# Patient Record
Sex: MALE | Race: BLACK OR AFRICAN AMERICAN | NOT HISPANIC OR LATINO | Employment: UNEMPLOYED | URBAN - METROPOLITAN AREA
[De-identification: names, ages, dates, MRNs, and addresses within clinical notes are randomized per-mention and may not be internally consistent; named-entity substitution may affect disease eponyms.]

---

## 2017-02-05 ENCOUNTER — HOSPITAL ENCOUNTER (EMERGENCY)
Facility: HOSPITAL | Age: 22
Discharge: HOME | End: 2017-02-05
Attending: EMERGENCY MEDICINE
Payer: MEDICAID

## 2017-02-05 VITALS
OXYGEN SATURATION: 97 % | HEIGHT: 70 IN | RESPIRATION RATE: 20 BRPM | BODY MASS INDEX: 21.47 KG/M2 | DIASTOLIC BLOOD PRESSURE: 59 MMHG | TEMPERATURE: 99 F | SYSTOLIC BLOOD PRESSURE: 126 MMHG | HEART RATE: 65 BPM | WEIGHT: 150 LBS

## 2017-02-05 DIAGNOSIS — J06.9 VIRAL URI WITH COUGH: Primary | ICD-10-CM

## 2017-02-05 DIAGNOSIS — R05.9 COUGH: ICD-10-CM

## 2017-02-05 LAB
DEPRECATED S PYO AG THROAT QL EIA: NEGATIVE
HETEROPH AB SERPL QL IA: NEGATIVE

## 2017-02-05 PROCEDURE — 94640 AIRWAY INHALATION TREATMENT: CPT

## 2017-02-05 PROCEDURE — 99284 EMERGENCY DEPT VISIT MOD MDM: CPT | Mod: 25

## 2017-02-05 PROCEDURE — 87081 CULTURE SCREEN ONLY: CPT

## 2017-02-05 PROCEDURE — 87880 STREP A ASSAY W/OPTIC: CPT

## 2017-02-05 PROCEDURE — 86308 HETEROPHILE ANTIBODY SCREEN: CPT

## 2017-02-05 PROCEDURE — 25000003 PHARM REV CODE 250: Performed by: PHYSICIAN ASSISTANT

## 2017-02-05 PROCEDURE — 25000242 PHARM REV CODE 250 ALT 637 W/ HCPCS: Performed by: PHYSICIAN ASSISTANT

## 2017-02-05 RX ORDER — IBUPROFEN 400 MG/1
400 TABLET ORAL
Status: COMPLETED | OUTPATIENT
Start: 2017-02-05 | End: 2017-02-05

## 2017-02-05 RX ORDER — BENZONATATE 100 MG/1
100 CAPSULE ORAL 3 TIMES DAILY PRN
Qty: 12 CAPSULE | Refills: 0 | Status: SHIPPED | OUTPATIENT
Start: 2017-02-05 | End: 2017-02-10

## 2017-02-05 RX ORDER — IPRATROPIUM BROMIDE AND ALBUTEROL SULFATE 2.5; .5 MG/3ML; MG/3ML
3 SOLUTION RESPIRATORY (INHALATION)
Status: COMPLETED | OUTPATIENT
Start: 2017-02-05 | End: 2017-02-05

## 2017-02-05 RX ORDER — ALBUTEROL SULFATE 90 UG/1
1-2 AEROSOL, METERED RESPIRATORY (INHALATION) EVERY 6 HOURS PRN
Qty: 1 INHALER | Refills: 0 | Status: SHIPPED | OUTPATIENT
Start: 2017-02-05 | End: 2018-01-20

## 2017-02-05 RX ADMIN — IBUPROFEN 400 MG: 400 TABLET, FILM COATED ORAL at 01:02

## 2017-02-05 RX ADMIN — IPRATROPIUM BROMIDE AND ALBUTEROL SULFATE 3 ML: .5; 3 SOLUTION RESPIRATORY (INHALATION) at 01:02

## 2017-02-05 NOTE — ED PROVIDER NOTES
Encounter Date: 2/5/2017    SCRIBE #1 NOTE: I, Shelley Gillespie, am scribing for, and in the presence of,  Carol Ann Kaba PA-C. I have scribed the following portions of the note - Other sections scribed: ROS and HPI.       History     Chief Complaint   Patient presents with    Nasal Congestion     Pt states he has been coughing up a lot of mucous.      Cough     Review of patient's allergies indicates:   Allergen Reactions    Penicillins     Eggs [egg derived]      HPI Comments: CC: Nasal Congestion.    HPI: This 21 y.o. male with a past medical history of Asthma, presents to the ED complaining of productive cough with green mucous and associated wheezing for 2 days. He also c/o sore throat, fatigue, nasal congestion, and rhinorrhea. He reports recent mold and dust exposure while cleaning grandfather's house and denies wearing mask. Pt states he has intermittent frontal HA with no associated vision changes, sensitivity to noise or light.  He reports no relief of symptoms with Flonase.    The history is provided by the patient. No  was used.     Past Medical History   Diagnosis Date    Asthma      No past medical history pertinent negatives.  Past Surgical History   Procedure Laterality Date    Knee arthroscopy      Cyst removal       History reviewed. No pertinent family history.  Social History   Substance Use Topics    Smoking status: Never Smoker    Smokeless tobacco: None    Alcohol use No     Review of Systems   Constitutional: Negative for chills and fever.   HENT: Positive for congestion, postnasal drip, rhinorrhea and sore throat. Negative for ear pain and facial swelling.    Eyes: Negative for itching and visual disturbance.   Respiratory: Positive for cough (productive). Negative for shortness of breath.    Cardiovascular: Negative for chest pain.   Gastrointestinal: Negative for abdominal pain, diarrhea, nausea and vomiting.   Musculoskeletal: Negative for myalgias.    Neurological: Positive for headaches (frontal). Negative for weakness and numbness.       Physical Exam   Initial Vitals   BP Pulse Resp Temp SpO2   02/05/17 1200 02/05/17 1200 02/05/17 1200 02/05/17 1200 02/05/17 1200   127/60 51 18 97.4 °F (36.3 °C) 99 %     Physical Exam    Nursing note and vitals reviewed.  Constitutional: He appears well-developed and well-nourished. No distress.   HENT:   Head: Normocephalic and atraumatic.   Right Ear: External ear normal.   Left Ear: A middle ear effusion is present.   Nose: Mucosal edema present. Right sinus exhibits no maxillary sinus tenderness and no frontal sinus tenderness. Left sinus exhibits no maxillary sinus tenderness and no frontal sinus tenderness.   Mouth/Throat: Uvula is midline. Oropharyngeal exudate and posterior oropharyngeal erythema present. No posterior oropharyngeal edema.   Cardiovascular: Regular rhythm. Bradycardia present.    Pulmonary/Chest: No respiratory distress. He has no decreased breath sounds. He has wheezes in the left upper field. He has no rhonchi. He has no rales.   Abdominal: Soft. Normal appearance and bowel sounds are normal. There is no tenderness.   Lymphadenopathy:     He has cervical adenopathy.        Right cervical: Posterior cervical adenopathy present.        Left cervical: Posterior cervical adenopathy present.   Neurological: He has normal strength. No cranial nerve deficit or sensory deficit.         ED Course   Procedures  Labs Reviewed   THROAT SCREEN, RAPID   CULTURE, STREP A,  THROAT   HETEROPHILE AB SCREEN             Medical Decision Making:   Initial Assessment:   Pt is 22 y/o male with PMHx of asthma presents for evaluation of productive cough with wheezing, sore throat, and fatigue. Reports recent mold and dust exposure. Pt afebrile in NAD. On exam, mild wheezing to MERE. +Mucosal edema. +oropharyngeal erythema and exudate to L tonsil. Posterior lymphadenopathy. Breathing treatment and ibuprofen given. I  considered strep pharyngitis, mono, PNA, and viral URI. Mono and strep negative. CXR negative for acute abnormalities. I suspect viral URI. Upon re-evaluation, pt wheezing resolved. Will treat pt symptomatically with tessalon and albuterol. Instructed to use OTC flonase and singulair. Told pt to begin using face mask while working in areas of mold and dust exposure. Discharged to home in stable condition. Follow up with PCP for re-evaluation in 2 days. Return to ED if develop difficulty breathing, if symptoms worsen and as needed.      I discussed this pt with Dr. Potter who agrees with assessment and plan.             Scribe Attestation:   Scribe #1: I performed the above scribed service and the documentation accurately describes the services I performed. I attest to the accuracy of the note.    Attending Attestation:     Physician Attestation Statement for NP/PA:   I discussed this assessment and plan of this patient with the NP/PA, but I did not personally examine the patient. The face to face encounter was performed by the NP/PA.    Other NP/PA Attestation Additions:      Medical Decision Makin-year-old male presenting with cough and nasal congestion.  Also has exudate on tonsils.  Rapid strep and Monospot negative.  Chest x-ray shows no pneumonia.  I agree with plan.       Physician Attestation for Scribe:  Physician Attestation Statement for Scribe #1: I, Carol Ann Kaba PA-C, reviewed documentation, as scribed by Shelley Gillespie in my presence, and it is both accurate and complete.                 ED Course     Clinical Impression:   The primary encounter diagnosis was Viral URI with cough. A diagnosis of Cough was also pertinent to this visit.    Disposition:   Disposition: Discharged  Condition: Stable       Carol Ann Kaba PA-C  17 [phone]       Kevin Potter MD  17 [phone]

## 2017-02-05 NOTE — DISCHARGE INSTRUCTIONS
Viral Upper Respiratory Illness with Wheezing (Adult)  You have a viral upper respiratory illness (URI), which is another term for the common cold. When the infection causes a lot of irritation, the air passages can go into spasm. This causes wheezing and shortness of breath.    This illness is contagious during the first few days. It is spread through the air by coughing and sneezing. It may also be spread by direct contact (touching the sick person and then touching your own eyes, nose, or mouth). Frequent handwashing will decrease the risk.  Most viral illnesses go away within 7 to 10 days with rest and simple home remedies. Sometimes the illness may last for several weeks. Antibiotics will not kill a virus, and they are generally not prescribed for this condition.  Home care  · If symptoms are severe, rest at home for the first 2 to 3 days. When you resume activity, don't let yourself get too tired.  · Avoid being exposed to cigarette smoke (yours or others).  · You may use acetaminophen or ibuprofen to control pain and fever, unless another medicine was prescribed. (Note: If you have chronic liver or kidney disease, have ever had a stomach ulcer or gastrointestinal bleeding, or are taking blood-thinning medicines, talk with your healthcare provider before using these medicines.) Aspirin should never be given to anyone under 18 years of age who is ill with a viral infection or fever. It may cause severe liver or brain damage.  · Your appetite may be poor, so a light diet is fine. Avoid dehydration by drinking 6 to 8 glasses of fluids per day (water, soft drinks, juices, tea, or soup). Extra fluids will help loosen secretions in the nose and lungs.  · Over-the-counter cold medicines will not shorten the length of time youre sick, but they may be helpful for the following symptoms: cough, sore throat, and nasal and sinus congestion. (Note: Do not use decongestants if you have high blood pressure.)  Follow-up  care  Follow up with your healthcare provider, or as advised.  When to seek medical advice  Call your healthcare provider right away if any of these occur:  · Cough with lots of colored sputum (mucus)  · Severe headache; face, neck, or ear pain  · Difficulty swallowing due to throat pain  · Fever of 100.4ºF (38ºC) or higher, or as directed by your healthcare provider  Call 911, or get immediate medical care  Call emergency services right away if any of these occur:  · Chest pain, shortness of breath, worsening wheezing, or difficulty breathing  · Coughing up blood  · Inability to swallow due to throat pain    You were prescribed Tessalon for cough and Albuterol Inhaler for wheezing today. Use as prescribed. Follow up with primary care doctor for re-evaluation in 2 days and to establish primary care. Continue using Flonase and you may also purchase over-the-counter Singulair to help with congestion. Return to ER if you develop difficulty breathing, if symptoms worsen or as needed.

## 2017-02-05 NOTE — ED AVS SNAPSHOT
[facility]  [address] [place] [address]               Han Aguirre   2017 12:35 PM   ED    Description:  Male : 1995   Department:  [facility]           Your Care was Coordinated By:     Provider Role From To    Kevin Potter MD Attending Provider 17 [phone] --    Carol Ann Kaba PA-C Physician Assistant 17 [phone] --      Reason for Visit     Nasal Congestion     Cough           Diagnoses this Visit        Comments    Viral URI with cough    -  Primary     Cough           ED Disposition     None           To Do List           Follow-up Information     Follow up with Alejandrina Bhardwaj MD. Schedule an appointment as soon as possible for a visit in 2 days.    Specialty:  Internal Medicine    Why:  for re-evaluation and to establish primary care    Contact information:    [address]  [facility] [address]  [phone]          Go to [facility].    Specialty:  Emergency Medicine    Why:  As needed, If symptoms worsen    Contact information:    [address] [place] [id number] [phone]       These Medications        Disp Refills Start End    albuterol 90 mcg/actuation inhaler 1 Inhaler 0 2017 2/10/2017    Inhale 1-2 puffs into the lungs every 6 (six) hours as needed for Wheezing. Rescue - Inhalation    benzonatate (TESSALON) 100 MG capsule 12 capsule 0 2017 2/10/2017    Take 1 capsule (100 mg total) by mouth 3 (three) times daily as needed for Cough. - Oral      [facility]     [facility] -  Assistance  Registered nurses in the [facility] provide clinical advisement, health education, appointment booking, and other advisory services.  Call for this free service at [phone].             Medications           Message regarding Medications     Verify the changes and/or additions to your medication  regime listed below are the same as discussed with your clinician today.  If any of these changes or additions are incorrect, please notify your healthcare provider.        START taking these NEW medications        Refills    albuterol 90 mcg/actuation inhaler 0    Sig: Inhale 1-2 puffs into the lungs every 6 (six) hours as needed for Wheezing. Rescue    Class: Print    Route: Inhalation    benzonatate (TESSALON) 100 MG capsule 0    Sig: Take 1 capsule (100 mg total) by mouth 3 (three) times daily as needed for Cough.    Class: Print    Route: Oral      These medications were administered today        Dose Freq    ibuprofen tablet 400 mg 400 mg ED 1 Time    Sig: Take 1 tablet (400 mg total) by mouth ED 1 Time.    Class: Normal    Route: Oral    Cosign for Ordering: Accepted by Kevin Potter MD on 2/5/2017 12:58 PM    albuterol-ipratropium 2.5mg-0.5mg/3mL nebulizer solution 3 mL 3 mL ED 1 Time    Sig: Take 3 mLs by nebulization ED 1 Time.    Class: Normal    Route: Nebulization    Cosign for Ordering: Accepted by Kevin Potter MD on 2/5/2017 12:58 PM      STOP taking these medications     albuterol (PROVENTIL) 2.5 mg /3 mL (0.083 %) nebulizer solution Take 2.5 mg by nebulization every 6 (six) hours as needed.    montelukast (SINGULAIR) 10 mg tablet Take 10 mg by mouth every evening.    cetirizine (ZYRTEC) 5 MG chewable tablet Take 5 mg by mouth once daily.    ibuprofen (ADVIL,MOTRIN) 600 MG tablet Take 1 tablet (600 mg total) by mouth 4 (four) times daily as needed for Pain.           Verify that the below list of medications is an accurate representation of the medications you are currently taking.  If none reported, the list may be blank. If incorrect, please contact your healthcare provider. Carry this list with you in case of emergency.           Current Medications     albuterol 90 mcg/actuation inhaler Inhale 1-2 puffs into the lungs every 6 (six) hours as needed for Wheezing. Rescue    benzonatate  "(TESSALON) 100 MG capsule Take 1 capsule (100 mg total) by mouth 3 (three) times daily as needed for Cough.           Clinical Reference Information           Your Vitals Were     BP Pulse Temp Resp Height Weight    127/60 (BP Location: Left arm) 57 97.4 °F (36.3 °C) (Oral) 17 5' 10" (1.778 m) 68 kg (150 lb)    SpO2 BMI             99% 21.52 kg/m2         Allergies as of 2/5/2017        Reactions    Penicillins     Eggs [Egg Derived]       Immunizations Administered on Date of Encounter - 2/5/2017     None      ED Micro, Lab, POCT     Start Ordered       Status Ordering Provider    02/05/17 1318 02/05/17 1317    Once,   Status:  Canceled      Canceled     02/05/17 1256 02/05/17 1256  Throat Screen, Rapid  Once      Final result     02/05/17 1256 02/05/17 1256  Heterophile Ab Screen  Once      Final result     02/05/17 1256 02/05/17 1256  Strep A culture, throat  Once      In process       ED Imaging Orders     Start Ordered       Status Ordering Provider    02/05/17 1256 02/05/17 1256  X-Ray Chest PA And Lateral  1 time imaging      Final result         Discharge Instructions         Viral Upper Respiratory Illness with Wheezing (Adult)  You have a viral upper respiratory illness (URI), which is another term for the common cold. When the infection causes a lot of irritation, the air passages can go into spasm. This causes wheezing and shortness of breath.    This illness is contagious during the first few days. It is spread through the air by coughing and sneezing. It may also be spread by direct contact (touching the sick person and then touching your own eyes, nose, or mouth). Frequent handwashing will decrease the risk.  Most viral illnesses go away within 7 to 10 days with rest and simple home remedies. Sometimes the illness may last for several weeks. Antibiotics will not kill a virus, and they are generally not prescribed for this condition.  Home care  · If symptoms are severe, rest at home for the first 2 to " 3 days. When you resume activity, don't let yourself get too tired.  · Avoid being exposed to cigarette smoke (yours or others).  · You may use acetaminophen or ibuprofen to control pain and fever, unless another medicine was prescribed. (Note: If you have chronic liver or kidney disease, have ever had a stomach ulcer or gastrointestinal bleeding, or are taking blood-thinning medicines, talk with your healthcare provider before using these medicines.) Aspirin should never be given to anyone under 18 years of age who is ill with a viral infection or fever. It may cause severe liver or brain damage.  · Your appetite may be poor, so a light diet is fine. Avoid dehydration by drinking 6 to 8 glasses of fluids per day (water, soft drinks, juices, tea, or soup). Extra fluids will help loosen secretions in the nose and lungs.  · Over-the-counter cold medicines will not shorten the length of time youre sick, but they may be helpful for the following symptoms: cough, sore throat, and nasal and sinus congestion. (Note: Do not use decongestants if you have high blood pressure.)  Follow-up care  Follow up with your healthcare provider, or as advised.  When to seek medical advice  Call your healthcare provider right away if any of these occur:  · Cough with lots of colored sputum (mucus)  · Severe headache; face, neck, or ear pain  · Difficulty swallowing due to throat pain  · Fever of 100.4ºF (38ºC) or higher, or as directed by your healthcare provider  Call 911, or get immediate medical care  Call emergency services right away if any of these occur:  · Chest pain, shortness of breath, worsening wheezing, or difficulty breathing  · Coughing up blood  · Inability to swallow due to throat pain    You were prescribed Tessalon for cough and Albuterol Inhaler for wheezing today. Use as prescribed. Follow up with primary care doctor for re-evaluation in 2 days and to establish primary care. Continue using Flonase and you may also  purchase over-the-counter Singulair to help with congestion. Return to ER if you develop difficulty breathing, if symptoms worsen or as needed.         [facility] Sign-Up     Activating your [facility] account is as easy as 1-2-3!     1) Visit [email or web address], select Sign Up Now, enter this activation code and your date of birth, then select Next.  [email or web address]  Expires: 3/22/2017  2:13 PM      2) Create a username and password to use when you visit [facility] in the future and select a security question in case you lose your password and select Next.    3) Enter your e-mail address and click Sign Up!    Additional Information  If you have questions, please e-mail [email or web address] or call [phone] to talk to our [facility] staff. Remember, [facility] is NOT to be used for urgent needs. For medical emergencies, dial 911.          [facility] complies with applicable Federal civil rights laws and does not discriminate on the basis of race, color, national origin, age, disability, or sex.        Language Assistance Services     ATTENTION: Language assistance services are available, free of charge. Please call [phone].      ATENCIÓN: Si habla español, tiene a sharma disposición servicios gratuitos de asistencia lingüística. Llame al [phone].     MICHELLE Ý: N?u b?n nói Ti?ng Vi?t, có các d?ch v? h? tr? ngôn ng? mi?n phí dành cho b?n. G?i s? [phone].

## 2017-02-07 LAB — BACTERIA THROAT CULT: NORMAL

## 2017-02-23 ENCOUNTER — HOSPITAL ENCOUNTER (EMERGENCY)
Facility: HOSPITAL | Age: 22
Discharge: HOME | End: 2017-02-23
Attending: EMERGENCY MEDICINE
Payer: MEDICAID

## 2017-02-23 VITALS
BODY MASS INDEX: 21.7 KG/M2 | HEART RATE: 60 BPM | RESPIRATION RATE: 18 BRPM | DIASTOLIC BLOOD PRESSURE: 73 MMHG | WEIGHT: 155 LBS | OXYGEN SATURATION: 98 % | SYSTOLIC BLOOD PRESSURE: 121 MMHG | HEIGHT: 71 IN | TEMPERATURE: 98 F

## 2017-02-23 DIAGNOSIS — N34.1 URETHRITIS, NONSPECIFIC: Primary | ICD-10-CM

## 2017-02-23 PROCEDURE — 99283 EMERGENCY DEPT VISIT LOW MDM: CPT

## 2017-02-23 PROCEDURE — 87591 N.GONORRHOEAE DNA AMP PROB: CPT

## 2017-02-23 PROCEDURE — 25000003 PHARM REV CODE 250: Performed by: NURSE PRACTITIONER

## 2017-02-23 RX ORDER — AZITHROMYCIN 250 MG/1
2000 TABLET, FILM COATED ORAL
Status: COMPLETED | OUTPATIENT
Start: 2017-02-23 | End: 2017-02-23

## 2017-02-23 RX ORDER — ONDANSETRON 4 MG/1
4 TABLET, ORALLY DISINTEGRATING ORAL
Status: COMPLETED | OUTPATIENT
Start: 2017-02-23 | End: 2017-02-23

## 2017-02-23 RX ADMIN — AZITHROMYCIN 2000 MG: 250 TABLET, FILM COATED ORAL at 01:02

## 2017-02-23 RX ADMIN — ONDANSETRON 4 MG: 4 TABLET, ORALLY DISINTEGRATING ORAL at 01:02

## 2017-02-23 NOTE — ED AVS SNAPSHOT
[facility]  [place] [address]               Han Aguirre   2017 11:33 AM   ED    Description:  Male : 1995   Department:  [facility]           Your Care was Coordinated By:     Provider Role From To    Epifanio Martinez MD Attending Provider 17 1140 --    Christine Headley NP Nurse Practitioner 17 0217 --      Reason for Visit     Dysuria           Diagnoses this Visit        Comments    Urethritis, nonspecific    -  Primary       ED Disposition     None           To Do List           Follow-up Information     Follow up with Juve Locke MD.    Specialty:  Family Medicine    Why:  As needed    Contact information:    [address] [place] [address] [phone]          Follow up with [facility].    Specialty:  Emergency Medicine    Why:  If symptoms worsen or any other concerns    Contact information:    [place] [address] [phone]      [facility]     [facility] Line -  Assistance  Registered nurses in the [facility] provide clinical advisement, health education, appointment booking, and other advisory services.  Call for this free service at [phone].             Medications           Message regarding Medications     Verify the changes and/or additions to your medication regime listed below are the same as discussed with your clinician today.  If any of these changes or additions are incorrect, please notify your healthcare provider.        These medications were administered today        Dose Freq    ondansetron disintegrating tablet 4 mg 4 mg ED 1 Time    Sig: Take 1 tablet (4 mg total) by mouth ED 1 Time.    Class: Normal    Route: Oral    azithromycin tablet 2,000 mg 2,000 mg ED 1 Time    Sig: Take 8 tablets (2,000 mg total) by mouth ED 1 Time.    Class: Normal    Route: Oral           Verify  "that the below list of medications is an accurate representation of the medications you are currently taking.  If none reported, the list may be blank. If incorrect, please contact your healthcare provider. Carry this list with you in case of emergency.           Current Medications     albuterol 90 mcg/actuation inhaler Inhale 1-2 puffs into the lungs every 6 (six) hours as needed for Wheezing. Rescue    azithromycin tablet 2,000 mg Take 8 tablets (2,000 mg total) by mouth ED 1 Time.    ondansetron disintegrating tablet 4 mg Take 1 tablet (4 mg total) by mouth ED 1 Time.           Clinical Reference Information           Your Vitals Were     BP Pulse Temp Resp Height Weight    124/72 (BP Location: Right arm, Patient Position: Sitting) 64 98.4 °F (36.9 °C) (Oral) 17 5' 11" (1.803 m) 70.3 kg (155 lb)    SpO2 BMI             98% 21.62 kg/m2         Allergies as of 2/23/2017        Reactions    Penicillins     Eggs [Egg Derived]       Immunizations Administered on Date of Encounter - 2/23/2017     None      ED Micro, Lab, POCT     Start Ordered       Status Ordering Provider    02/23/17 1154 02/23/17 1154  C. trachomatis/N. gonorrhoeae by AMP DNA Urine  STAT      In process       ED Imaging Orders     None        Discharge Instructions         Urethritis Due to Gonorrhea or Chlamydia (Adult male)    You have urethritis. This is an inflammation in the urethra. The urethra is the tube between the bladder and the tip of the penis. Urine drains out of the body through the urethra. There are 2 main types of this condition:  · Gonococcal urethritis () is an infection caused by gonorrhea.  · Non-gonococcal urethritis (GRABIEL) is an infection that is usually caused by chlamydia. Other infections can also be the cause.  Women often have no symptoms. Men are more likely to have symptoms, but may not. Symptoms can start within 1 week after infection, but can take a month or more, if they even occur. Some symptoms are:  · Burning " or pain when urinating  · Irritation in the penis  · Pus discharge from the penis  · Pain and possible swelling in one or both testicles  Infections in the urethra are usually caused by sexually transmitted diseases, or STDs. The most common infections are gonorrhea, chlamydia, or both.  Gonorrhea infections  Gonococcal urethritis () is an infection of the urethra. It is caused by gonorrhea. Gonorrhea is a sexually transmitted infection (STI). Gonorrhea can also be in other areas of the body. This can cause:  · Rectal pain and discharge  · Throat infection  · Eye infections (conjunctivitis)  Without treatment, the infection can get worse and spread to other parts of your body. The infection can cause rashes, arthritis, and infections in your joints, heart, and brain.  Non-gonococcal infections, or GRABIEL infections  Non-gonococcal urethritis (GRABIEL) is an infection of the urethra. It is usually caused by chlamydia. Symptoms may clear up in a few weeks or months, even without treatment. However, without treatment, the bacteria that cause GRABIEL can stay in the urethra. This means that even if symptoms clear, you can still have an infection. You can spread it to others if you are not treated.  Urethritis caused by an infection can be cured, but it needs to be treated with antibiotics. If you don't get treated, you can give it to someone else. If you give it to a woman, it can cause a serious pelvic infection and infertility.  It is important to remember that you can have an infection without symptoms. For this reason, your sexual partner(s) needs to be treated, even if he or she has no symptoms. If he or she is not treated, and you continue to have sex, you will be infected again. Your partner(s) should contact his or her own healthcare provider to be examined and treated. An urgent care clinic or the Public Health Department can also do this.  Home care  The following guidelines will help you care for yourself at  home:  · Take all the antibiotics you were given until they are used up. It is important to finish them, even if you are feeling better. This ensures the infection is completely cleared up.  · No sex until both you and your partner(s) have finished all the antibiotics, and your healthcare provider says you are no longer contagious.  · You can take acetaminophen or ibuprofen for pain, unless you were given a different pain medicine. If you have chronic liver or kidney disease or have ever had a stomach ulcer or GI bleeding, or are taking blood thinners, talk with your healthcare provider before using these medicines.  · Aspirin should never be used in anyone under 18 years of age who has a fever.  · Learn about and use safe sex practices. The safest sex is with a partner who has tested negative and only has sex with you. Condoms can keep some STDs from spreading, including gonorrhea, chlamydia and HIV, but are not a guarantee.  Follow-up care  Follow up with your healthcare provider, or as advised. If a culture test was taken, you may call for the results as directed. Another culture test should be done 4 to 6 weeks after treatment to be sure the infection is gone. Follow up with your healthcare provider or the Public Health Department for a complete STD screening, including HIV testing. For more information about STDs, contact CDC-INFO at 209-399-2196.  When to seek medical advice  Call your healthcare provider right away if any of these occur:  · No improvement after 3 days of treatment, although you can have some symptoms longer  · Unable to urinate  · Rash or joint pain  · Painful sores on the penis  · Enlarged painful lymph nodes (lumps) in the groin  · Testicle pain or swelling of the scrotum  Date Last Reviewed: 10/1/2016  © 3553-0836 [employer]. [address] [place] [address], [place] [address]. All rights reserved. This information is not intended as a substitute for professional medical care. Always  follow your healthcare professional's instructions.          Urethritis in Men     An inflamed urethra can cause pain during urination.   Urethritis occurs when the urethra is inflamed. The urethra is the tube that runs from the bladder through the penis. The urethra can become swollen and cause burning pain when you urinate. Symptoms may include itching or tingling of the penis or a pus discharge from the penis. You may also have pain with sex and masturbation. Some men may not have symptoms.  What causes urethritis?  Urethritis can be caused by a bacterial or viral infection. Such an infection can lead to conditions such as a urinary tract infection (UTI) or sexually transmitted diseases (STD). Urethritis can also be caused by injury or sensitivity or allergy to chemicals in lotions and other products.  How is urethritis diagnosed?  Your health care provider will examine you and ask about your symptoms and health history. You may also have one or more of the following tests:  · Urine test to take samples of urine and have them checked for problems.  · Blood test to take a sample of blood and have it checked for problems.  · Urethral discharge to take a sample of fluid from inside the urethra. A cotton swab is inserted into the opening of the penis and into the urethra.  · Cystoscopy to allow the health care provider to look for problems in the urinary tract. The test uses a thin, flexible telescope called a cystoscope with a light and camera attached. The scope is inserted into the urethra.  How is urethritis treated?  Treatment depends on the cause of urethritis. If its due to a bacterial infection, antibiotics (medications that fight infection) will be given. Your health care provider can tell you more about your treatment options. In the meantime, your symptoms can be treated. To relieve pain and swelling, anti-inflammatory medications, such as ibuprofen, may be given. Untreated, symptoms may get worse. Also,  scar tissue can form in the urethra, causing it to narrow.  When to seek medical care   Call the health care provider right away if you have any of the following:  · Fever of 100.4°F (38.0°C) or higher   · Blood in the urine or semen  · Burning pain with urination  · Increased urge to urinate  · Discharge from the penis  · Itching, tenderness, or swelling in the penis or groin  · Pain during sex or masturbation   Preventing STDs  When it comes to sex, its important to take care and be safe. Any sexual contact with the penis, vagina, anus, or mouth can spread an STD. The only sure way to prevent STDs is abstinence (not having sex). But there are ways to make sex safer. Use a latex condom each time you have sex. And talk to your partner about STDs before you have sex.   Date Last Reviewed: 9/7/2014  © 2899-9765 [employer]. [address] [place] [address]. All rights reserved. This information is not intended as a substitute for professional medical care. Always follow your healthcare professional's instructions.          [facility] Sign-Up     Activating your [facility] account is as easy as 1-2-3!     1) Visit [email or web address], select Sign Up Now, enter this activation code and your date of birth, then select Next.  [email or web address]  Expires: 3/22/2017  2:13 PM      2) Create a username and password to use when you visit [facility] in the future and select a security question in case you lose your password and select Next.    3) Enter your e-mail address and click Sign Up!    Additional Information  If you have questions, please e-mail [email or web address] or call [phone] to talk to our [facility] staff. Remember, [facility] is NOT to be used for urgent needs. For medical emergencies, dial 911.          [facility] complies with applicable Federal civil rights laws and does not discriminate on the basis of race, color, national origin, age, disability, or sex.         Language Assistance Services     ATTENTION: Language assistance services are available, free of charge. Please call [phone].      ATENCIÓN: Si habla navañol, tiene a sharma disposición servicios gratuitos de asistencia lingüística. Llame al [phone].     CHÚ Ý: N?u b?n nói Ti?ng Vi?t, có các d?ch v? h? tr? ngôn ng? mi?n phí dành cho b?n. G?i s? [phone].

## 2017-02-23 NOTE — DISCHARGE INSTRUCTIONS
Urethritis Due to Gonorrhea or Chlamydia (Adult male)    You have urethritis. This is an inflammation in the urethra. The urethra is the tube between the bladder and the tip of the penis. Urine drains out of the body through the urethra. There are 2 main types of this condition:  · Gonococcal urethritis () is an infection caused by gonorrhea.  · Non-gonococcal urethritis (GRABIEL) is an infection that is usually caused by chlamydia. Other infections can also be the cause.  Women often have no symptoms. Men are more likely to have symptoms, but may not. Symptoms can start within 1 week after infection, but can take a month or more, if they even occur. Some symptoms are:  · Burning or pain when urinating  · Irritation in the penis  · Pus discharge from the penis  · Pain and possible swelling in one or both testicles  Infections in the urethra are usually caused by sexually transmitted diseases, or STDs. The most common infections are gonorrhea, chlamydia, or both.  Gonorrhea infections  Gonococcal urethritis () is an infection of the urethra. It is caused by gonorrhea. Gonorrhea is a sexually transmitted infection (STI). Gonorrhea can also be in other areas of the body. This can cause:  · Rectal pain and discharge  · Throat infection  · Eye infections (conjunctivitis)  Without treatment, the infection can get worse and spread to other parts of your body. The infection can cause rashes, arthritis, and infections in your joints, heart, and brain.  Non-gonococcal infections, or GRABIEL infections  Non-gonococcal urethritis (GRABIEL) is an infection of the urethra. It is usually caused by chlamydia. Symptoms may clear up in a few weeks or months, even without treatment. However, without treatment, the bacteria that cause GRABIEL can stay in the urethra. This means that even if symptoms clear, you can still have an infection. You can spread it to others if you are not treated.  Urethritis caused by an infection can be cured, but it  needs to be treated with antibiotics. If you don't get treated, you can give it to someone else. If you give it to a woman, it can cause a serious pelvic infection and infertility.  It is important to remember that you can have an infection without symptoms. For this reason, your sexual partner(s) needs to be treated, even if he or she has no symptoms. If he or she is not treated, and you continue to have sex, you will be infected again. Your partner(s) should contact his or her own healthcare provider to be examined and treated. An urgent care clinic or the Public Health Department can also do this.  Home care  The following guidelines will help you care for yourself at home:  · Take all the antibiotics you were given until they are used up. It is important to finish them, even if you are feeling better. This ensures the infection is completely cleared up.  · No sex until both you and your partner(s) have finished all the antibiotics, and your healthcare provider says you are no longer contagious.  · You can take acetaminophen or ibuprofen for pain, unless you were given a different pain medicine. If you have chronic liver or kidney disease or have ever had a stomach ulcer or GI bleeding, or are taking blood thinners, talk with your healthcare provider before using these medicines.  · Aspirin should never be used in anyone under 18 years of age who has a fever.  · Learn about and use safe sex practices. The safest sex is with a partner who has tested negative and only has sex with you. Condoms can keep some STDs from spreading, including gonorrhea, chlamydia and HIV, but are not a guarantee.  Follow-up care  Follow up with your healthcare provider, or as advised. If a culture test was taken, you may call for the results as directed. Another culture test should be done 4 to 6 weeks after treatment to be sure the infection is gone. Follow up with your healthcare provider or the Public Health Department for a complete  STD screening, including HIV testing. For more information about STDs, contact CDC-INFO at 940-687-9186.  When to seek medical advice  Call your healthcare provider right away if any of these occur:  · No improvement after 3 days of treatment, although you can have some symptoms longer  · Unable to urinate  · Rash or joint pain  · Painful sores on the penis  · Enlarged painful lymph nodes (lumps) in the groin  · Testicle pain or swelling of the scrotum  Date Last Reviewed: 10/1/2016  © 7316-8943 [employer]. [address] [place] [address]. All rights reserved. This information is not intended as a substitute for professional medical care. Always follow your healthcare professional's instructions.          Urethritis in Men     An inflamed urethra can cause pain during urination.   Urethritis occurs when the urethra is inflamed. The urethra is the tube that runs from the bladder through the penis. The urethra can become swollen and cause burning pain when you urinate. Symptoms may include itching or tingling of the penis or a pus discharge from the penis. You may also have pain with sex and masturbation. Some men may not have symptoms.  What causes urethritis?  Urethritis can be caused by a bacterial or viral infection. Such an infection can lead to conditions such as a urinary tract infection (UTI) or sexually transmitted diseases (STD). Urethritis can also be caused by injury or sensitivity or allergy to chemicals in lotions and other products.  How is urethritis diagnosed?  Your health care provider will examine you and ask about your symptoms and health history. You may also have one or more of the following tests:  · Urine test to take samples of urine and have them checked for problems.  · Blood test to take a sample of blood and have it checked for problems.  · Urethral discharge to take a sample of fluid from inside the urethra. A cotton swab is inserted into the opening of the penis  and into the urethra.  · Cystoscopy to allow the health care provider to look for problems in the urinary tract. The test uses a thin, flexible telescope called a cystoscope with a light and camera attached. The scope is inserted into the urethra.  How is urethritis treated?  Treatment depends on the cause of urethritis. If its due to a bacterial infection, antibiotics (medications that fight infection) will be given. Your health care provider can tell you more about your treatment options. In the meantime, your symptoms can be treated. To relieve pain and swelling, anti-inflammatory medications, such as ibuprofen, may be given. Untreated, symptoms may get worse. Also, scar tissue can form in the urethra, causing it to narrow.  When to seek medical care   Call the health care provider right away if you have any of the following:  · Fever of 100.4°F (38.0°C) or higher   · Blood in the urine or semen  · Burning pain with urination  · Increased urge to urinate  · Discharge from the penis  · Itching, tenderness, or swelling in the penis or groin  · Pain during sex or masturbation   Preventing STDs  When it comes to sex, its important to take care and be safe. Any sexual contact with the penis, vagina, anus, or mouth can spread an STD. The only sure way to prevent STDs is abstinence (not having sex). But there are ways to make sex safer. Use a latex condom each time you have sex. And talk to your partner about STDs before you have sex.   Date Last Reviewed: 9/7/2014  © 7519-3083 [employer]. [address] [place] [address], [place] [address]. All rights reserved. This information is not intended as a substitute for professional medical care. Always follow your healthcare professional's instructions.

## 2017-02-23 NOTE — ED PROVIDER NOTES
Encounter Date: 2/23/2017    SCRIBE #1 NOTE: I, Abril Tejeda, am scribing for, and in the presence of,  Christine Headley MD. I have scribed the following portions of the note - Other sections scribed: HPI/ROS.       History     Chief Complaint   Patient presents with    Dysuria     Pt. c/o burning with urination and pain at the meatus that began today while at work.      Review of patient's allergies indicates:   Allergen Reactions    Penicillins     Eggs [egg derived]      HPI Comments: CC: Dysuria    HPI: 21 y.o. M with asthma presents to the ED c/o dysuria with associated penile pain beginning today. Pt is unsure about penile discharge. Pain is severe. Pt reports partner has not been complaining about any symptoms. Pt is concerned because his condom popped during a sexual encounter. Pt denies fever, testicular pain, abdominal pain, N/V/D, and any other symptoms. Pt is unsure about his immunization status.     The history is provided by the patient.     Past Medical History   Diagnosis Date    Asthma      Past Surgical History   Procedure Laterality Date    Knee arthroscopy      Cyst removal      Nose surgery       History reviewed. No pertinent family history.  Social History   Substance Use Topics    Smoking status: Never Smoker    Smokeless tobacco: None    Alcohol use No     Review of Systems   Constitutional: Negative for fever.   HENT: Negative for sore throat.    Eyes: Negative for visual disturbance.   Respiratory: Negative for cough.    Cardiovascular: Negative for chest pain.   Gastrointestinal: Negative for abdominal pain, nausea and vomiting.   Genitourinary: Positive for discharge, dysuria and penile pain. Negative for testicular pain.   Musculoskeletal: Negative for back pain.   Skin: Negative for rash.   Neurological: Negative for headaches.       Physical Exam   Initial Vitals   BP Pulse Resp Temp SpO2   02/23/17 1057 02/23/17 1057 02/23/17 1057 02/23/17 1057 02/23/17 1057   124/72 64 17  98.4 °F (36.9 °C) 98 %     Physical Exam    Nursing note and vitals reviewed.  Constitutional: He appears well-developed and well-nourished.   HENT:   Head: Normocephalic.   Eyes: Conjunctivae are normal.   Neck: Normal range of motion. Neck supple.   Abdominal: Soft.   Genitourinary: Discharge found.   Genitourinary Comments: Tender with pressure to glans.   No lesions present but (+) discharge at meatus.    Musculoskeletal: Normal range of motion.   Neurological: He is alert and oriented to person, place, and time.   Skin: Skin is warm and dry.   Psychiatric: He has a normal mood and affect.         ED Course   Procedures  Labs Reviewed   C. TRACHOMATIS/N. GONORRHOEAE BY AMP DNA             Medical Decision Making:   Initial Assessment:   21yr old male presents with 1 day h/o dysuria.    Differential Diagnosis:   UTI  Urethritis  STD  ED Management:  Pts exam was positive for penile discharge. pt does not appear ill or toxic, pt is afebrile with normal VS.    Based on exam today, and the history of unprotected sex, I believe pt has urethritis secondary to a STD.  Urine sent for culture.     Pt reports PCN allergy that causes throat to swell therefore, I will give 2gm of zithromax with zofran to combat GI side effects.     Referrals given for primary care.     Pt verbalizes understanding of d/c instructions, to include safe sex practices and informing his partners of urethritis and will return for worsening condition.    Case discussed with attending who agrees with assessment and plan.               Scribe Attestation:   Scribe #1: I performed the above scribed service and the documentation accurately describes the services I performed. I attest to the accuracy of the note.    Attending Attestation:     Physician Attestation Statement for NP/PA:   I discussed this assessment and plan of this patient with the NP/PA, but I did not personally examine the patient. The face to face encounter was performed by the  NP/PA.    Other NP/PA Attestation Additions:      Medical Decision Making: I have reviewed the documentation of the mid-level provider and discussed case in detail.  I agree with the differential diagnosis documentation and treatment plan.       Physician Attestation for Scribe:  Physician Attestation Statement for Scribe #1: I, Christine Headley MD, reviewed documentation, as scribed by Abril Tejeda in my presence, and it is both accurate and complete.                 ED Course     Clinical Impression:   The encounter diagnosis was Urethritis, nonspecific.    Disposition:   Disposition: Discharged  Condition: Stable       Christine Headley NP  02/23/17 1232       Epifanio Martinez MD  02/24/17 1210

## 2017-02-27 LAB
C TRACH DNA SPEC QL NAA+PROBE: POSITIVE
N GONORRHOEA DNA SPEC QL NAA+PROBE: POSITIVE

## 2017-03-18 ENCOUNTER — HOSPITAL ENCOUNTER (EMERGENCY)
Facility: HOSPITAL | Age: 22
Discharge: HOME | End: 2017-03-18
Attending: EMERGENCY MEDICINE
Payer: MEDICAID

## 2017-03-18 VITALS
HEIGHT: 70 IN | RESPIRATION RATE: 16 BRPM | WEIGHT: 163 LBS | BODY MASS INDEX: 23.34 KG/M2 | SYSTOLIC BLOOD PRESSURE: 136 MMHG | TEMPERATURE: 98 F | HEART RATE: 43 BPM | DIASTOLIC BLOOD PRESSURE: 56 MMHG | OXYGEN SATURATION: 100 %

## 2017-03-18 DIAGNOSIS — R11.0 NAUSEA: Primary | ICD-10-CM

## 2017-03-18 DIAGNOSIS — T07.XXXA ABRASIONS OF MULTIPLE SITES: ICD-10-CM

## 2017-03-18 LAB
BILIRUB UR QL STRIP: NEGATIVE
CLARITY UR: CLEAR
COLOR UR: YELLOW
GLUCOSE UR QL STRIP: NEGATIVE
HGB UR QL STRIP: NEGATIVE
KETONES UR QL STRIP: NEGATIVE
LEUKOCYTE ESTERASE UR QL STRIP: NEGATIVE
NITRITE UR QL STRIP: NEGATIVE
PH UR STRIP: 5 [PH] (ref 5–8)
PROT UR QL STRIP: NEGATIVE
SP GR UR STRIP: 1.02 (ref 1–1.03)
URN SPEC COLLECT METH UR: NORMAL
UROBILINOGEN UR STRIP-ACNC: NEGATIVE EU/DL

## 2017-03-18 PROCEDURE — 90471 IMMUNIZATION ADMIN: CPT | Performed by: EMERGENCY MEDICINE

## 2017-03-18 PROCEDURE — 81003 URINALYSIS AUTO W/O SCOPE: CPT

## 2017-03-18 PROCEDURE — 90715 TDAP VACCINE 7 YRS/> IM: CPT | Performed by: EMERGENCY MEDICINE

## 2017-03-18 PROCEDURE — 63600175 PHARM REV CODE 636 W HCPCS: Performed by: EMERGENCY MEDICINE

## 2017-03-18 PROCEDURE — 25000003 PHARM REV CODE 250: Performed by: EMERGENCY MEDICINE

## 2017-03-18 PROCEDURE — 99283 EMERGENCY DEPT VISIT LOW MDM: CPT

## 2017-03-18 RX ORDER — ONDANSETRON 4 MG/1
4 TABLET, FILM COATED ORAL EVERY 8 HOURS PRN
Qty: 12 TABLET | Refills: 0 | Status: SHIPPED | OUTPATIENT
Start: 2017-03-18 | End: 2017-05-23

## 2017-03-18 RX ORDER — ONDANSETRON 8 MG/1
8 TABLET, ORALLY DISINTEGRATING ORAL
Status: COMPLETED | OUTPATIENT
Start: 2017-03-18 | End: 2017-03-18

## 2017-03-18 RX ADMIN — CLOSTRIDIUM TETANI TOXOID ANTIGEN (FORMALDEHYDE INACTIVATED), CORYNEBACTERIUM DIPHTHERIAE TOXOID ANTIGEN (FORMALDEHYDE INACTIVATED), BORDETELLA PERTUSSIS TOXOID ANTIGEN (GLUTARALDEHYDE INACTIVATED), BORDETELLA PERTUSSIS FILAMENTOUS HEMAGGLUTININ ANTIGEN (FORMALDEHYDE INACTIVATED), BORDETELLA PERTUSSIS PERTACTIN ANTIGEN, AND BORDETELLA PERTUSSIS FIMBRIAE 2/3 ANTIGEN 0.5 ML: 5; 2; 2.5; 5; 3; 5 INJECTION, SUSPENSION INTRAMUSCULAR at 10:03

## 2017-03-18 RX ADMIN — ONDANSETRON 8 MG: 8 TABLET, ORALLY DISINTEGRATING ORAL at 10:03

## 2017-03-18 NOTE — ED AVS SNAPSHOT
[facility]  [address] [place] [address]               Han Aguirre   3/18/2017  9:58 AM   ED    Description:  Male : 1995   Department:  [facility]           Your Care was Coordinated By:     Provider Role From To    Epifanio Martinez MD Attending Provider 17 0954 --      Reason for Visit     Abdominal Pain     Dizziness     Nausea           Diagnoses this Visit        Comments    Nausea    -  Primary     Abrasions of multiple sites           ED Disposition     None           To Do List           Follow-up Information     Follow up with Michael Perry MD In 3 days.    Specialty:  Family Medicine    Contact information:    [facility] [facility]  [place] [id number] [phone]         These Medications        Disp Refills Start End    ondansetron (ZOFRAN) 4 MG tablet 12 tablet 0 3/18/2017     Take 1 tablet (4 mg total) by mouth every 8 (eight) hours as needed (Nausea and vomiting). - Oral      [facility]     [facility] Nurse Care Line -  Assistance  Registered nurses in the [facility] Center provide clinical advisement, health education, appointment booking, and other advisory services.  Call for this free service at [phone].             Medications           Message regarding Medications     Verify the changes and/or additions to your medication regime listed below are the same as discussed with your clinician today.  If any of these changes or additions are incorrect, please notify your healthcare provider.        START taking these NEW medications        Refills    ondansetron (ZOFRAN) 4 MG tablet 0    Sig: Take 1 tablet (4 mg total) by mouth every 8 (eight) hours as needed (Nausea and vomiting).    Class: Print    Route: Oral      These medications were administered today        Dose Freq    ondansetron disintegrating tablet 8 mg 8 mg ED 1 Time    Sig: Take 1 tablet (8 mg total) by mouth ED 1  "Time.    Class: Normal    Route: Oral    Tdap vaccine injection 0.5 mL 0.5 mL Once    Sig: Inject 0.5 mLs into the muscle once.    Class: Normal    Route: Intramuscular           Verify that the below list of medications is an accurate representation of the medications you are currently taking.  If none reported, the list may be blank. If incorrect, please contact your healthcare provider. Carry this list with you in case of emergency.           Current Medications     albuterol 90 mcg/actuation inhaler Inhale 1-2 puffs into the lungs every 6 (six) hours as needed for Wheezing. Rescue    ondansetron (ZOFRAN) 4 MG tablet Take 1 tablet (4 mg total) by mouth every 8 (eight) hours as needed (Nausea and vomiting).           Clinical Reference Information           Your Vitals Were     BP Pulse Temp Resp Height Weight    136/56 (BP Location: Right arm, Patient Position: Sitting) 43 97.5 °F (36.4 °C) (Oral) 16 5' 10" (1.778 m) 73.9 kg (163 lb)    SpO2 BMI             100% 23.39 kg/m2         Allergies as of 3/18/2017        Reactions    Penicillins     Eggs [Egg Derived]       Immunizations Administered on Date of Encounter - 3/18/2017     Name Date Dose VIS Date Route    TDAP 3/18/2017 0.5 mL 2/24/2015 Intramuscular      ED Micro, Lab, POCT     Start Ordered       Status Ordering Provider    03/18/17 1007 03/18/17 1007  Urinalysis  STAT      Final result       ED Imaging Orders     None        Discharge Instructions       Please return immediately if you get worse or if new problems develop.  Please make an appointment to see your primary care doctor this week.  Rest.  Use Neosporin on your abrasions.  You may use Band-Aids on the abrasions on your feet.  Zofran for nausea clear liquids only while you're nauseated.  Please return immediately if you get worse or if new problems develop.  Rest.     [facility] Sign-Up     Activating your [facility] account is as easy as 1-2-3!     1) Visit [email or web address], select Sign Up Now, " enter this activation code and your date of birth, then select Next.  [email or web address]  Expires: 3/22/2017  3:13 PM      2) Create a username and password to use when you visit [facility] in the future and select a security question in case you lose your password and select Next.    3) Enter your e-mail address and click Sign Up!    Additional Information  If you have questions, please e-mail [email or web address] or call [phone] to talk to our [facility] staff. Remember, [facility] is NOT to be used for urgent needs. For medical emergencies, dial 911.          [facility] complies with applicable Federal civil rights laws and does not discriminate on the basis of race, color, national origin, age, disability, or sex.        Language Assistance Services     ATTENTION: Language assistance services are available, free of charge. Please call [phone].      ATENCIÓN: Si habla red, tiene a sharma disposición servicios gratuitos de asistencia lingüística. Llame al [phone].     CHÚ Ý: N?u b?n nói Ti?ng Vi?t, có các d?ch v? h? tr? ngôn ng? mi?n phí dành cho b?n. G?i s? [phone].

## 2017-03-18 NOTE — ED PROVIDER NOTES
Encounter Date: 3/18/2017    SCRIBE #1 NOTE: I, Janet Kamara, am scribing for, and in the presence of,  Epifanio Martinez MD. I have scribed the following portions of the note - Other sections scribed: HPI/ROS.       History     Chief Complaint   Patient presents with    Abdominal Pain     patient stated he woke up with abdominal pain    Dizziness    Nausea     Review of patient's allergies indicates:   Allergen Reactions    Penicillins     Eggs [egg derived]      HPI Comments: CC: Nausea    HPI: This 21 y.o. male with asthma presents to the ED with his mother c/o nausea that began this morning.  No reported treatment was attempted prior to arrival.  The patient denies fever, chills, dysuria, abdominal pain or any other associated symptoms.  He also c/o an 8x8 cm abrasion to his right gluteal region that is healing, superficial scratches on his bilateral hands and a fresh abrasion on his left great toe.  He reports the injuries occurred three days ago while playing basket ball.  His tetanus is not up to date.  The patient does not smoke/drink and is allergic to penicillins.            The history is provided by the patient.     Past Medical History:   Diagnosis Date    Asthma      Past Surgical History:   Procedure Laterality Date    CYST REMOVAL      KNEE ARTHROSCOPY      NOSE SURGERY       History reviewed. No pertinent family history.  Social History   Substance Use Topics    Smoking status: Never Smoker    Smokeless tobacco: None    Alcohol use No     Review of Systems   Constitutional: Negative for chills and fever.   HENT: Negative for ear pain, rhinorrhea, sneezing and sore throat.    Eyes: Negative for visual disturbance.   Respiratory: Negative for cough and shortness of breath.    Cardiovascular: Negative for chest pain and leg swelling.   Gastrointestinal: Positive for nausea. Negative for abdominal pain, diarrhea and vomiting.   Genitourinary: Negative for dysuria, frequency and hematuria.    Musculoskeletal: Negative for back pain and neck pain.   Skin: Positive for wound (face, buttock, left toe and bilateral hand abrasions). Negative for rash.   Neurological: Negative for dizziness and headaches.       Physical Exam   Initial Vitals   BP Pulse Resp Temp SpO2   03/18/17 0949 03/18/17 0949 03/18/17 0949 03/18/17 0949 03/18/17 0949   136/56 43 16 97.5 °F (36.4 °C) 100 %     Physical Exam  The patient was examined specifically for the following:   General:No significant distress, Good color, Warm and dry. Head and neck:Scalp atraumatic, Neck supple. Neurological:Appropriate conversation, Gross motor deficits. Eyes:Conjugate gaze, Clear corneas. ENT: No epistaxis. Cardiac: Regular rate and rhythm, Grossly normal heart tones. Pulmonary: Wheezing, Rales. Gastrointestinal: Abdominal tenderness, Abdominal distention. Musculoskeletal: Extremity deformity, Apparent pain with range of motion of the joints. Skin: Rash.   The findings on examination were normal except for the following: The patient has a 8 x 8 cm abrasion that is healing with healthy fibrin clot on the right gluteal region.  He has a fresh abrasion on the left great toe without evidence of infection 1 cm.  There is some superficial scratches on his hands.  The abdomen is completely nontender.  The lungs are clear.  The heart tones are normal.  ED Course   Procedures  Labs Reviewed   URINALYSIS          Medical decision making: Given the above I will check a urine on this patient to rule out UTI.  The patient has nausea.  Otherwise he has some abrasions.  There is no history of head neck or spinal trauma there are no chest or abdominal complaints other than nausea.  The patient has no painful urination there is no diarrhea or abdominal pain.  The urinalysis is negative.                Scribe Attestation:   Scribe #1: I performed the above scribed service and the documentation accurately describes the services I performed. I attest to the accuracy  of the note.    Attending Attestation:           Physician Attestation for Scribe:  Physician Attestation Statement for Scribe #1: I, Epifanio Martinez MD, reviewed documentation, as scribed by Janet Kamara in my presence, and it is both accurate and complete.                 ED Course     Clinical Impression:   The primary encounter diagnosis was Nausea. A diagnosis of Abrasions of multiple sites was also pertinent to this visit.          Epifanio Martinez MD  03/20/17 6354

## 2017-03-18 NOTE — DISCHARGE INSTRUCTIONS
Please return immediately if you get worse or if new problems develop.  Please make an appointment to see your primary care doctor this week.  Rest.  Use Neosporin on your abrasions.  You may use Band-Aids on the abrasions on your feet.  Zofran for nausea clear liquids only while you're nauseated.  Please return immediately if you get worse or if new problems develop.  Rest.

## 2017-03-18 NOTE — ED TRIAGE NOTES
"Pt. With PMH of anxiety and ADHD presents with feelings of anxiety that 'began shortly after he graduated from high school" accompanied by abdominal pain that has now resolved. Pt. States the pain began this morning while he was trying to have a bowel movement but "I couldn't even focus". Pt. States his family members used to give him medications to calm him down but he doesn't remember the name of medication.   "

## 2017-05-15 ENCOUNTER — HOSPITAL ENCOUNTER (EMERGENCY)
Facility: HOSPITAL | Age: 22
Discharge: HOME | End: 2017-05-15
Attending: EMERGENCY MEDICINE
Payer: MEDICAID

## 2017-05-15 VITALS
RESPIRATION RATE: 18 BRPM | OXYGEN SATURATION: 99 % | DIASTOLIC BLOOD PRESSURE: 55 MMHG | WEIGHT: 160 LBS | TEMPERATURE: 98 F | SYSTOLIC BLOOD PRESSURE: 107 MMHG | HEART RATE: 55 BPM | BODY MASS INDEX: 24.25 KG/M2 | HEIGHT: 68 IN

## 2017-05-15 DIAGNOSIS — S46.911A RIGHT SHOULDER STRAIN, INITIAL ENCOUNTER: Primary | ICD-10-CM

## 2017-05-15 DIAGNOSIS — S01.01XA SCALP LACERATION, INITIAL ENCOUNTER: ICD-10-CM

## 2017-05-15 PROCEDURE — 99283 EMERGENCY DEPT VISIT LOW MDM: CPT | Mod: 25

## 2017-05-15 PROCEDURE — 25000003 PHARM REV CODE 250: Performed by: NURSE PRACTITIONER

## 2017-05-15 PROCEDURE — 12001 RPR S/N/AX/GEN/TRNK 2.5CM/<: CPT

## 2017-05-15 RX ORDER — CYCLOBENZAPRINE HCL 10 MG
10 TABLET ORAL NIGHTLY PRN
Qty: 15 TABLET | Refills: 0 | Status: SHIPPED | OUTPATIENT
Start: 2017-05-15 | End: 2017-05-23

## 2017-05-15 RX ORDER — LIDOCAINE HYDROCHLORIDE 10 MG/ML
10 INJECTION INFILTRATION; PERINEURAL
Status: COMPLETED | OUTPATIENT
Start: 2017-05-15 | End: 2017-05-15

## 2017-05-15 RX ORDER — IBUPROFEN 600 MG/1
600 TABLET ORAL EVERY 6 HOURS PRN
Qty: 20 TABLET | Refills: 0 | Status: SHIPPED | OUTPATIENT
Start: 2017-05-15 | End: 2019-02-03

## 2017-05-15 RX ADMIN — LIDOCAINE HYDROCHLORIDE 10 ML: 10 INJECTION, SOLUTION INFILTRATION; PERINEURAL at 10:05

## 2017-05-15 RX ADMIN — BACITRACIN, NEOMYCIN, POLYMYXIN B 1 EACH: 400; 3.5; 5 OINTMENT TOPICAL at 10:05

## 2017-05-15 NOTE — ED AVS SNAPSHOT
[facility]  [address] [place] [address]               Han Aguirre   5/15/2017  9:49 PM   ED    Description:  Male : 1995   Department:  [facility]           Your Care was Coordinated By:     Provider Role From To    Zelda aCrias MD Attending Provider 05/15/17 2155 --    Enriqueta Aguilar NP Nurse Practitioner 05/15/17 2155 --      Reason for Visit     Head Laceration           Diagnoses this Visit        Comments    Right shoulder strain, initial encounter    -  Primary     Scalp laceration, initial encounter           ED Disposition     ED Disposition Condition Comment    Discharge             To Do List           Follow-up Information     Follow up with [facility].    Specialty:  Emergency Medicine    Why:  If symptoms worsen, for staple removal in 7 days    Contact information:    [address] [place] [address] [phone]        Follow up with Chad Anderson MD.    Specialty:  Family Medicine    Why:  As needed, for staple removal in 7 days    Contact information:    [address]  [place] [id number] [phone]          Follow up with Ernie Corea MD.    Specialty:  Orthopedic Surgery    Why:  ORTHOPEDICS - CALL FOR APPOINTMENT IF SHOULDER PAIN PERSISTS    Contact information:    [address] [place] [id number] [phone]         These Medications        Disp Refills Start End    ibuprofen (ADVIL,MOTRIN) 600 MG tablet 20 tablet 0 5/15/2017     Take 1 tablet (600 mg total) by mouth every 6 (six) hours as needed for Pain. - Oral    cyclobenzaprine (FLEXERIL) 10 MG tablet 15 tablet 0 5/15/2017     Take 1 tablet (10 mg total) by mouth nightly as needed for Muscle spasms. May cause drowsiness - Oral      [facility]     [facility] Nurse Care Line -  Assistance  Unless otherwise directed by your provider, please contact  [facility], our nurse care line that is available for 24/7 assistance.     Registered nurses in the [facility] provide: appointment scheduling, clinical advisement, health education, and other advisory services.  Call: [phone] (toll free)               Medications           Message regarding Medications     Verify the changes and/or additions to your medication regime listed below are the same as discussed with your clinician today.  If any of these changes or additions are incorrect, please notify your healthcare provider.        START taking these NEW medications        Refills    ibuprofen (ADVIL,MOTRIN) 600 MG tablet 0    Sig: Take 1 tablet (600 mg total) by mouth every 6 (six) hours as needed for Pain.    Class: Print    Route: Oral    cyclobenzaprine (FLEXERIL) 10 MG tablet 0    Sig: Take 1 tablet (10 mg total) by mouth nightly as needed for Muscle spasms. May cause drowsiness    Class: Print    Route: Oral      These medications were administered today        Dose Freq    lidocaine HCL 10 mg/ml (1%) injection 10 mL 10 mL ED 1 Time    Sig: 10 mLs by Infiltration route ED 1 Time.    Class: Normal    Route: Infiltration    neomycin-bacitracnZn-polymyxnB packet  ED 1 Time    Sig: Apply topically ED 1 Time.    Class: Normal    Route: Topical (Top)           Verify that the below list of medications is an accurate representation of the medications you are currently taking.  If none reported, the list may be blank. If incorrect, please contact your healthcare provider. Carry this list with you in case of emergency.           Current Medications     albuterol 90 mcg/actuation inhaler Inhale 1-2 puffs into the lungs every 6 (six) hours as needed for Wheezing. Rescue    cyclobenzaprine (FLEXERIL) 10 MG tablet Take 1 tablet (10 mg total) by mouth nightly as needed for Muscle spasms. May cause drowsiness    ibuprofen (ADVIL,MOTRIN) 600 MG tablet Take 1 tablet (600 mg total) by mouth every 6 (six)  "hours as needed for Pain.    ondansetron (ZOFRAN) 4 MG tablet Take 1 tablet (4 mg total) by mouth every 8 (eight) hours as needed (Nausea and vomiting).           Clinical Reference Information           Your Vitals Were     BP Pulse Temp Resp Height Weight    123/65 (BP Location: Right arm, Patient Position: Sitting) 86 98.1 °F (36.7 °C) (Oral) 20 5' 8" (1.727 m) 72.6 kg (160 lb)    SpO2 BMI             96% 24.33 kg/m2         Allergies as of 5/15/2017        Reactions    Penicillins     Eggs [Egg Derived]       Immunizations Administered on Date of Encounter - 5/15/2017     None      ED Micro, Lab, POCT     None      ED Imaging Orders     None        Discharge Instructions       Try to get plenty of rest and stay well-hydrated on these medications.  RICE - Rest, Ice, Compress, Elevate (see handout).    Please take medications as prescribed.    Take Ibuprofen for pain and inflammation for breakthrough pain.  You can take flexeril as a muscle relaxant at night.  Please do not take flexeril while working, drinking alcohol, driving/operating heavy machinery, swimming. It may cause drowsiness, impair judgment, and reduce physical capabilities.    Follow up with your primary care doctor or orthopedics in 1 week if symptoms have not improved.    Return to ER for any new or worsening symptoms.      Discharge References/Attachments     LACERATION, SCALP: SUTURES OR STAPLES (ENGLISH)    SHOULDER SPRAIN  (ENGLISH)      [facility] Sign-Up     Activating your [facility] account is as easy as 1-2-3!     1) Visit [email or web address], select Sign Up Now, enter this activation code and your date of birth, then select Next.  [email or web address]  Expires: 6/29/2017 10:48 PM      2) Create a username and password to use when you visit [facility] in the future and select a security question in case you lose your password and select Next.    3) Enter your e-mail address and click Sign Up!    Additional Information  If you have questions, please " e-mail [email or web address] or call [phone] to talk to our [facility] staff. Remember, [facility] is NOT to be used for urgent needs. For medical emergencies, dial 911.          [facility] complies with applicable Federal civil rights laws and does not discriminate on the basis of race, color, national origin, age, disability, or sex.        Language Assistance Services     ATTENTION: Language assistance services are available, free of charge. Please call [phone].      ATENCIÓN: Si habla español, tiene a sharma disposición servicios gratuitos de asistencia lingüística. Llame al [phone].     CHÚ Ý: N?u b?n nói Ti?ng Vi?t, có các d?ch v? h? tr? ngôn ng? mi?n phí dành cho b?n. G?i s? [phone].

## 2017-05-16 NOTE — ED TRIAGE NOTES
Patient states he was playing basketball and fell back and hit the pole. Patient state he hit his head and right shoulder on it. Patient has a cut on his scalp and states his shoulder pain goes down his back. Patient denies LOC. Incident happened at 530 pm.

## 2017-05-16 NOTE — ED PROVIDER NOTES
Encounter Date: 5/15/2017    SCRIBE #1 NOTE: I, Janet Waddell, am scribing for, and in the presence of,  Enriqueta Aguilar NP. I have scribed the following portions of the note - Other sections scribed: HPI, ROS.       History     Chief Complaint   Patient presents with    Head Laceration     Pt states he hit his head today while playing basketball and running into a pole. Pt denies loc and have a small lac to right side of head.     Review of patient's allergies indicates:   Allergen Reactions    Penicillins     Eggs [egg derived]      HPI Comments: CC: Head Laceration    HPI: 21 year old male with a PMHx of asthma, deviated nasal septum, knee arthroscopy, and nose surgery presents to the ED for emergent evaluation of a laceration to the posterior scalp. Patient states he was playing basketball, and went to block a layup and hit a pole. Patient reports associated mild headache and pain over the area with the laceration.  He also reports hitting his back and right shoulder; his back does not hurt but he does have pain with movement of the shoulder. Patient denies a LOC. Patient notes he hit his back, but denies back pain. No prior treatment. Tetanus is UTD. Patient otherwise denies dizziness, nausea, vomiting, weakness, neck pain and other symptoms.      The history is provided by the patient. No  was used.     Past Medical History:   Diagnosis Date    Asthma     Deviated nasal septum      Past Surgical History:   Procedure Laterality Date    CYST REMOVAL      KNEE ARTHROSCOPY      NOSE SURGERY       History reviewed. No pertinent family history.  Social History   Substance Use Topics    Smoking status: Never Smoker    Smokeless tobacco: None    Alcohol use No     Review of Systems   Constitutional: Negative for chills and fever.   HENT: Negative for ear pain, rhinorrhea and sore throat.    Eyes: Negative for pain and visual disturbance.   Respiratory: Negative for cough and shortness of  breath.    Cardiovascular: Negative for chest pain.   Gastrointestinal: Negative for abdominal pain, diarrhea, nausea and vomiting.   Genitourinary: Negative for dysuria.   Musculoskeletal: Positive for arthralgias (R/shoulder ). Negative for back pain, joint swelling, neck pain and neck stiffness.   Skin: Positive for wound (laceration (posterior scalp)).   Neurological: Positive for headaches. Negative for dizziness, weakness and light-headedness.       Physical Exam   Initial Vitals   BP Pulse Resp Temp SpO2   05/15/17 1958 05/15/17 1958 05/15/17 1958 05/15/17 1958 05/15/17 1958   123/65 86 20 98.1 °F (36.7 °C) 96 %     Physical Exam    Nursing note and vitals reviewed.  Constitutional: Vital signs are normal. He appears well-developed and well-nourished. He is not diaphoretic. He is active and cooperative. He does not appear ill. No distress.   HENT:   Head: Normocephalic. Not macrocephalic and not microcephalic. Head is with laceration (~2.5 cm laceration to the occiptal scalp with slight soft tissue contusion. No underlying crepitus or skull deformity.). Head is without raccoon's eyes and without Liu's sign.   No hemotympanum   Eyes: Pupils are equal, round, and reactive to light.   Neck: Normal range of motion and full passive range of motion without pain. Neck supple. No spinous process tenderness and no muscular tenderness present.   Pulmonary/Chest: No respiratory distress.   Musculoskeletal:        Cervical back: He exhibits tenderness (moderate tenderness to the right scapular back) and pain. He exhibits normal range of motion, no bony tenderness (no midline spinal tenderness), no swelling, no edema, no deformity, no laceration, no spasm and normal pulse.        Back:    Neurological: He is alert and oriented to person, place, and time. He has normal strength. He displays normal reflexes. No cranial nerve deficit or sensory deficit. Coordination and gait normal. GCS eye subscore is 4. GCS verbal  subscore is 5. GCS motor subscore is 6.   Skin: Skin is warm and dry.   Psychiatric: He has a normal mood and affect.         ED Course   Lac Repair  Date/Time: 5/15/2017 10:58 PM  Performed by: MITZI THOMPSON  Authorized by: IAM ZHAO   Body area: head/neck  Location details: scalp  Laceration length: 2.5 cm  Foreign bodies: no foreign bodies  Tendon involvement: none  Nerve involvement: none  Vascular damage: no  Anesthesia: local infiltration    Anesthesia:  Anesthesia: local infiltration  Local Anesthetic: lidocaine 1% without epinephrine   Anesthetic total: 2 mL  Patient sedated: no  Preparation: Patient was prepped and draped in the usual sterile fashion.  Irrigation solution: saline  Irrigation method: jet lavage  Amount of cleaning: standard  Debridement: none  Degree of undermining: none  Skin closure: staples  Number of sutures: 5  Approximation: close  Approximation difficulty: simple  Dressing: antibiotic ointment  Patient tolerance: Patient tolerated the procedure well with no immediate complications        Labs Reviewed - No data to display             Additional MDM:   Comments: This is an urgent evaluation of a 21-year-old male that presents to the emergency room with a laceration while playing basketball this evening.  Patient reports that he accidentally collided with a basketball pole and injured his right shoulder and head.  Physical exam significant for a posterior scalp laceration with a small head contusion.  He denies loss of consciousness nausea, vomiting.  There is no underlying bony crepitus or skull deformity.  He has no neurological deficits. I offered to perform skull x-ray, but patient declined stating that he did not think mechanism was severe. I have a low suspicion for skull fracture at this time.  There is no evidence to support intracranial bleed.  He also complains of scapular back/shoulder pain. Findings are c/w a muscular strain.  There is no midline spinal tenderness.   His shoulder joint is nontender with FROM.  I do not see an indication for imaging.  Staples were placed for closure of scalp laceration.  There is no evidence of infectious process.  His tetanus up-to-date.  Rx ibuprofen and Flexeril for shoulder strain.  Educated on wound care and signs and symptoms to monitor for infection.  Follow-up with PCP or return for staple removal in 7 days.  Return precautions..          Scribe Attestation:   Scribe #1: I performed the above scribed service and the documentation accurately describes the services I performed. I attest to the accuracy of the note.    Attending Attestation:     Physician Attestation Statement for NP/PA:   I discussed this assessment and plan of this patient with the NP/PA, but I did not personally examine the patient. The face to face encounter was performed by the NP/PA.    Other NP/PA Attestation Additions:      Medical Decision Makin y.o. Male presents with head laceration s/p hitting a pole while playing basketball. No LOC. Laceration repaired per procedure note. Laceration repaired per procedure note. Will have him follow up with PCP or return to ED for wound check/staple removal. I have discussed this patient with mid-level provider and agree with physical exam, assessment and plan.       Physician Attestation for Scribe:  Physician Attestation Statement for Scribe #1: I, Enriqueta Aguilar NP, reviewed documentation, as scribed by Janet Waddell in my presence, and it is both accurate and complete.                 ED Course     Clinical Impression:   The primary encounter diagnosis was Right shoulder strain, initial encounter. A diagnosis of Scalp laceration, initial encounter was also pertinent to this visit.    Disposition:   Disposition: Discharged  Condition: Stable       Enriqueta Aguilar NP  05/15/17 2309       Zelda Carias MD  17 [id number]

## 2017-05-16 NOTE — DISCHARGE INSTRUCTIONS
Try to get plenty of rest and stay well-hydrated on these medications.  RICE - Rest, Ice, Compress, Elevate (see handout).    Please take medications as prescribed.    Take Ibuprofen for pain and inflammation for breakthrough pain.  You can take flexeril as a muscle relaxant at night.  Please do not take flexeril while working, drinking alcohol, driving/operating heavy machinery, swimming. It may cause drowsiness, impair judgment, and reduce physical capabilities.    Follow up with your primary care doctor or orthopedics in 1 week if symptoms have not improved.    Return to ER for any new or worsening symptoms.

## 2017-05-23 ENCOUNTER — HOSPITAL ENCOUNTER (EMERGENCY)
Facility: HOSPITAL | Age: 22
Discharge: HOME | End: 2017-05-23
Attending: EMERGENCY MEDICINE
Payer: MEDICAID

## 2017-05-23 VITALS
DIASTOLIC BLOOD PRESSURE: 73 MMHG | BODY MASS INDEX: 22.19 KG/M2 | WEIGHT: 155 LBS | HEIGHT: 70 IN | SYSTOLIC BLOOD PRESSURE: 133 MMHG | TEMPERATURE: 98 F | OXYGEN SATURATION: 100 % | HEART RATE: 69 BPM | RESPIRATION RATE: 18 BRPM

## 2017-05-23 DIAGNOSIS — Z51.89 VISIT FOR WOUND CHECK: ICD-10-CM

## 2017-05-23 DIAGNOSIS — Z48.02 ENCOUNTER FOR STAPLE REMOVAL: Primary | ICD-10-CM

## 2017-05-23 PROCEDURE — 99281 EMR DPT VST MAYX REQ PHY/QHP: CPT

## 2017-05-23 NOTE — ED TRIAGE NOTES
Pt reports he is here today to have his staples removed from the back of his head. Pt with 5 staples to left scalp. No drainage, no redness, no swelling noted.

## 2017-05-23 NOTE — ED PROVIDER NOTES
Encounter Date: 5/23/2017       History     Chief Complaint   Patient presents with    Wound Check     Pt. presents with staples to back of head to be removed. Well approximated, no swelling or tenderness.      Review of patient's allergies indicates:   Allergen Reactions    Penicillins     Eggs [egg derived]      CC: Staple Removal  HPI: Han Aguirre, a 21 y.o. male that presents to the ED for removal of staples that were placed at this facility on May 15, 2017.  He reports no headaches, wound drainage, fever, chills, nausea or vomiting.        The history is provided by the patient. No  was used.     Past Medical History:   Diagnosis Date    Asthma     Deviated nasal septum      Past Surgical History:   Procedure Laterality Date    CYST REMOVAL      KNEE ARTHROSCOPY      NOSE SURGERY       History reviewed. No pertinent family history.  Social History   Substance Use Topics    Smoking status: Never Smoker    Smokeless tobacco: Not on file    Alcohol use No     Review of Systems   Constitutional: Negative for chills and fever.   Gastrointestinal: Negative for nausea and vomiting.   Musculoskeletal: Negative for neck pain.   Skin: Positive for wound (well-healing stapled laceration to the left parietal scalp.).   Neurological: Negative for headaches.   Psychiatric/Behavioral: Negative for confusion.       Physical Exam     Initial Vitals [05/23/17 1226]   BP Pulse Resp Temp SpO2   133/73 69 17 98.2 °F (36.8 °C) 99 %     Physical Exam    Nursing note and vitals reviewed.  Constitutional: He appears well-developed and well-nourished. He is not diaphoretic. No distress.   HENT:   Head: Normocephalic. Head is without raccoon's eyes and without Liu's sign.       Right Ear: External ear normal.   Left Ear: External ear normal.   Neck: Normal range of motion.   Pulmonary/Chest: No respiratory distress.   Skin: Skin is warm and dry. Laceration (well-healing to parietal scalp)  noted. No erythema.   Psychiatric: He has a normal mood and affect. His behavior is normal. Judgment and thought content normal.         ED Course   Suture Removal  Date/Time: 5/23/2017 1:19 PM  Location procedure was performed: [facility] EMERGENCY DEPARTMENT  Performed by: YORDY CROW  Authorized by: GINNY PATEL   Body area: head/neck  Location details: scalp  Wound Appearance: clean, well healed, no drainage and nontender  Staples Removed: 5  Facility: sutures placed in this facility  Complications: No  Specimens: No  Implants: No  Patient tolerance: Patient tolerated the procedure well with no immediate complications        Labs Reviewed - No data to display          Medical Decision Making:   History:   Old Medical Records: I decided to obtain old medical records.  Old Records Summarized: records from previous admission(s).       <> Summary of Records: Recent seen in this emergency department on May 15, 2017 for scalp laceration.  He is able playing basketball running to a pole.  There is a 2.5 cm laceration with 5 staples.       APC / Resident Notes:   This is an evaluation of a 21 y.o. male that presents to the Emergency Department for a wound recheck. The patient was initially seen on 5/15/17 for a laceration to the parietal scalp. Physical exam reveals a nontoxic and well appearing male. Patient is afebrile vital signs are stable. Neurological exam reveals an alert and oriented patient. Wound exam reveals a well-healing laceration to the parietal scalpe with no surrounding erythema and induration noted. 5 staples in place. No purulent discharge expressed. Staples removed without immediate complication. The wound appears to be healing well.  Vital Signs Reassuring.    The diagnosis, treatment plan, instructions for follow-up and reevaluation with his PCP as needed as well as ED return precautions have been discussed and he has verbalized an understanding of the information. All questions or concerns have  been addressed. This case was discussed with Dr. Ford who is in agreement with my assessment and plan. LYDIA Espana, JORDANP-C               ED Course     Clinical Impression:   The primary encounter diagnosis was Encounter for staple removal. A diagnosis of Visit for wound check was also pertinent to this visit.    Disposition:   Disposition: Discharged  Condition: Stable       GONZALO Virk  05/23/17 1266

## 2017-05-23 NOTE — DISCHARGE INSTRUCTIONS
Please return to the Emergency Department for any new or worsening symptoms including: wound drainage, fever, chest pain, shortness of breath, loss of consciousness, dizziness, weakness, or any other concerns.     Please follow up with your Primary Care Provider as needed. If you do not have a Primary Care Provider, you may contact the one listed on your discharge paperwork or you may also call the [facility] Appointment Desk at [phone] to schedule an appointment with a Primary Care Provider.

## 2018-01-20 ENCOUNTER — HOSPITAL ENCOUNTER (EMERGENCY)
Facility: HOSPITAL | Age: 23
Discharge: HOME | End: 2018-01-20
Attending: EMERGENCY MEDICINE
Payer: MEDICAID

## 2018-01-20 VITALS
RESPIRATION RATE: 14 BRPM | SYSTOLIC BLOOD PRESSURE: 117 MMHG | HEIGHT: 70 IN | DIASTOLIC BLOOD PRESSURE: 71 MMHG | WEIGHT: 170 LBS | HEART RATE: 74 BPM | TEMPERATURE: 99 F | OXYGEN SATURATION: 97 % | BODY MASS INDEX: 24.34 KG/M2

## 2018-01-20 DIAGNOSIS — R21 RASH IN ADULT: ICD-10-CM

## 2018-01-20 DIAGNOSIS — J06.9 URI WITH COUGH AND CONGESTION: Primary | ICD-10-CM

## 2018-01-20 DIAGNOSIS — S62.91XA HAND FRACTURE, RIGHT, CLOSED, INITIAL ENCOUNTER: ICD-10-CM

## 2018-01-20 PROCEDURE — 99284 EMERGENCY DEPT VISIT MOD MDM: CPT

## 2018-01-20 PROCEDURE — 25000003 PHARM REV CODE 250: Performed by: NURSE PRACTITIONER

## 2018-01-20 PROCEDURE — 25000242 PHARM REV CODE 250 ALT 637 W/ HCPCS: Performed by: NURSE PRACTITIONER

## 2018-01-20 RX ORDER — ALBUTEROL SULFATE 90 UG/1
1-2 AEROSOL, METERED RESPIRATORY (INHALATION) EVERY 6 HOURS PRN
Qty: 1 INHALER | Refills: 0 | Status: SHIPPED | OUTPATIENT
Start: 2018-01-20 | End: 2018-01-25

## 2018-01-20 RX ORDER — FLUTICASONE PROPIONATE 50 MCG
2 SPRAY, SUSPENSION (ML) NASAL
Status: COMPLETED | OUTPATIENT
Start: 2018-01-20 | End: 2018-01-20

## 2018-01-20 RX ORDER — FLUTICASONE PROPIONATE 50 MCG
1 SPRAY, SUSPENSION (ML) NASAL 2 TIMES DAILY PRN
Qty: 15 G | Refills: 0 | Status: SHIPPED | OUTPATIENT
Start: 2018-01-20 | End: 2019-02-03

## 2018-01-20 RX ORDER — CETIRIZINE HYDROCHLORIDE 10 MG/1
10 TABLET ORAL NIGHTLY
Qty: 30 TABLET | Refills: 0 | Status: SHIPPED | OUTPATIENT
Start: 2018-01-20 | End: 2019-02-03

## 2018-01-20 RX ORDER — CETIRIZINE HYDROCHLORIDE 10 MG/1
10 TABLET ORAL
Status: COMPLETED | OUTPATIENT
Start: 2018-01-20 | End: 2018-01-20

## 2018-01-20 RX ADMIN — FLUTICASONE PROPIONATE 100 MCG: 50 SPRAY, METERED NASAL at 12:01

## 2018-01-20 RX ADMIN — CETIRIZINE HYDROCHLORIDE 10 MG: 10 TABLET, FILM COATED ORAL at 12:01

## 2018-01-20 RX ADMIN — Medication: at 12:01

## 2018-01-20 NOTE — DISCHARGE INSTRUCTIONS
Please wear Ace bandage for comfort, take Motrin or Tylenol as needed for pain. It is imperative that he follow up with orthopedics for continued care and possible physical therapy.    Take medications as prescribed, use cool mist humidifier to loosen secretions, use albuterol inhaler as needed for episodes of wheezing.  Follow up with your primary care provider.  Use cream as needed for dry skin.     Please return to the Emergency Department for any new or worsening symptoms including: fever, chest pain, shortness of breath, loss of consciousness, dizziness, weakness, or any other concerns.     Please follow up with your Primary Care Provider within in the week. If you do not have one, you may contact the one listed on your discharge paperwork or you may also call the [facility] Appointment Desk at [phone] to schedule an appointment with one.     Please take all medication as prescribed.
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
## 2018-01-20 NOTE — ED TRIAGE NOTES
"Arrived via personal transportation. Congestion x 3 days. Productive cough. Pt states "I'm always congested. I think it's my sinuses". PMH of asthma. Denies SOB at moment. Aaox4.   "

## 2018-11-19 ENCOUNTER — HOSPITAL ENCOUNTER (EMERGENCY)
Facility: HOSPITAL | Age: 23
Discharge: HOME | End: 2018-11-19
Attending: EMERGENCY MEDICINE

## 2018-11-19 VITALS
HEART RATE: 82 BPM | OXYGEN SATURATION: 98 % | HEIGHT: 69 IN | RESPIRATION RATE: 20 BRPM | WEIGHT: 175 LBS | TEMPERATURE: 99 F | DIASTOLIC BLOOD PRESSURE: 64 MMHG | SYSTOLIC BLOOD PRESSURE: 128 MMHG | BODY MASS INDEX: 25.92 KG/M2

## 2018-11-19 DIAGNOSIS — Z20.2 STD EXPOSURE: ICD-10-CM

## 2018-11-19 DIAGNOSIS — A74.9 CHLAMYDIA: Primary | ICD-10-CM

## 2018-11-19 DIAGNOSIS — R36.9 PENILE DISCHARGE: ICD-10-CM

## 2018-11-19 DIAGNOSIS — J02.9 PHARYNGITIS, UNSPECIFIED ETIOLOGY: ICD-10-CM

## 2018-11-19 LAB
BACTERIA #/AREA URNS HPF: ABNORMAL /HPF
BILIRUB UR QL STRIP: NEGATIVE
CLARITY UR: CLEAR
COLOR UR: YELLOW
DEPRECATED S PYO AG THROAT QL EIA: NEGATIVE
GLUCOSE UR QL STRIP: NEGATIVE
HGB UR QL STRIP: ABNORMAL
KETONES UR QL STRIP: NEGATIVE
LEUKOCYTE ESTERASE UR QL STRIP: ABNORMAL
MICROSCOPIC COMMENT: ABNORMAL
NITRITE UR QL STRIP: NEGATIVE
NON-SQ EPI CELLS #/AREA URNS HPF: 2 /HPF
PH UR STRIP: 6 [PH] (ref 5–8)
PROT UR QL STRIP: NEGATIVE
RBC #/AREA URNS HPF: 1 /HPF (ref 0–4)
SP GR UR STRIP: 1.02 (ref 1–1.03)
URN SPEC COLLECT METH UR: ABNORMAL
UROBILINOGEN UR STRIP-ACNC: NEGATIVE EU/DL
WBC #/AREA URNS HPF: 6 /HPF (ref 0–5)

## 2018-11-19 PROCEDURE — 25000003 PHARM REV CODE 250: Performed by: PHYSICIAN ASSISTANT

## 2018-11-19 PROCEDURE — 87880 STREP A ASSAY W/OPTIC: CPT

## 2018-11-19 PROCEDURE — 87491 CHLMYD TRACH DNA AMP PROBE: CPT

## 2018-11-19 PROCEDURE — 99284 EMERGENCY DEPT VISIT MOD MDM: CPT | Mod: 25

## 2018-11-19 PROCEDURE — 87081 CULTURE SCREEN ONLY: CPT

## 2018-11-19 PROCEDURE — 96372 THER/PROPH/DIAG INJ SC/IM: CPT

## 2018-11-19 PROCEDURE — 63600175 PHARM REV CODE 636 W HCPCS: Performed by: NURSE PRACTITIONER

## 2018-11-19 PROCEDURE — 25000003 PHARM REV CODE 250: Performed by: NURSE PRACTITIONER

## 2018-11-19 PROCEDURE — 81000 URINALYSIS NONAUTO W/SCOPE: CPT

## 2018-11-19 RX ORDER — ONDANSETRON 8 MG/1
8 TABLET, ORALLY DISINTEGRATING ORAL
Status: COMPLETED | OUTPATIENT
Start: 2018-11-19 | End: 2018-11-19

## 2018-11-19 RX ORDER — AZITHROMYCIN 250 MG/1
2000 TABLET, FILM COATED ORAL
Status: COMPLETED | OUTPATIENT
Start: 2018-11-19 | End: 2018-11-19

## 2018-11-19 RX ORDER — GENTAMICIN SULFATE 40 MG/ML
240 INJECTION, SOLUTION INTRAMUSCULAR; INTRAVENOUS
Status: COMPLETED | OUTPATIENT
Start: 2018-11-19 | End: 2018-11-19

## 2018-11-19 RX ADMIN — ONDANSETRON 8 MG: 8 TABLET, ORALLY DISINTEGRATING ORAL at 02:11

## 2018-11-19 RX ADMIN — AZITHROMYCIN 2000 MG: 250 TABLET, FILM COATED ORAL at 02:11

## 2018-11-19 RX ADMIN — GENTAMICIN SULFATE 240 MG: 40 INJECTION, SOLUTION INTRAMUSCULAR; INTRAVENOUS at 02:11

## 2018-11-19 RX ADMIN — Medication 10 ML: at 02:11

## 2018-11-19 NOTE — ED PROVIDER NOTES
"Encounter Date: 11/19/2018    SCRIBE #1 NOTE: I, Fidelia Dougherty, am scribing for, and in the presence of,  GONZALO French. I have scribed the following portions of the note - Other sections scribed: HPI, ROS.       History     Chief Complaint   Patient presents with    Sore Throat     " i have a sore throat, eveytime i brush my tongue it starts to bleed, in the back of my tongue" since " sometime in october"     Exposure to STD     " i recently did an STD test at [facility] and it came back positive for chlamidia" reports positive a week ago      CC: Sore Throat    HPI: This is a 24 y/o male with Asthma who presents to the ED c/o acute onset sore throat that began about x3 weeks ago. Pt has taken OTC Cold and Flu with minimal relief. Pt also reports that he tested positive for chlamydia after doing STD testing at [facility] last week. Pt notes that he had unprotected sex x2 weeks ago. Pt notes that he only has female partners. Pt reports that he gets chest congestion some mornings. Pt ran out of his inhaler about x1 year ago. Denies fever, chills, penile discharge, penile pain, or any further symptoms.      The history is provided by the patient. No  was used.     Review of patient's allergies indicates:   Allergen Reactions    Penicillins     Eggs [egg derived]      Past Medical History:   Diagnosis Date    Asthma     Deviated nasal septum      Past Surgical History:   Procedure Laterality Date    CYST REMOVAL      KNEE ARTHROSCOPY      NOSE SURGERY       History reviewed. No pertinent family history.  Social History     Tobacco Use    Smoking status: Current Every Day Smoker     Types: Cigarettes    Smokeless tobacco: Never Used   Substance Use Topics    Alcohol use: No    Drug use: No     Review of Systems   Constitutional: Negative for chills and fever.   HENT: Positive for sore throat. Negative for congestion, ear pain and rhinorrhea.    Eyes: Negative for pain and visual disturbance. "   Respiratory: Negative for cough and shortness of breath.    Cardiovascular: Negative for chest pain.   Gastrointestinal: Negative for abdominal pain, diarrhea, nausea and vomiting.   Genitourinary: Negative for dysuria.   Musculoskeletal: Negative for back pain and neck pain.   Skin: Negative for rash.   Neurological: Negative for headaches.       Physical Exam     Initial Vitals [11/19/18 1349]   BP Pulse Resp Temp SpO2   133/66 87 20 98.5 °F (36.9 °C) 97 %      MAP       --         Physical Exam    Nursing note and vitals reviewed.  Constitutional: He appears well-developed and well-nourished. He is not diaphoretic. He is cooperative.  Non-toxic appearance. He does not have a sickly appearance. He does not appear ill. No distress.   HENT:   Head: Normocephalic and atraumatic.   Mouth/Throat: Oropharynx is clear and moist.   Mild posterior pharyngeal erythema without tonsillar exudates.  Midline uvula. No evidence of PTA.   Eyes: Conjunctivae and EOM are normal.   Neck: Normal range of motion.   Cardiovascular: Normal rate and regular rhythm.   Pulmonary/Chest: Effort normal. No tachypnea and no bradypnea. No respiratory distress.   Abdominal: Soft. He exhibits no distension. There is no tenderness. There is no rigidity, no rebound and no guarding.   Genitourinary: Testes normal. Cremasteric reflex is present. Right testis shows no mass, no swelling and no tenderness. Left testis shows no mass, no swelling and no tenderness. Circumcised. Discharge (clear, thin) found.   Lymphadenopathy: No inguinal adenopathy noted on the right or left side.   Neurological: He is alert and oriented to person, place, and time. GCS eye subscore is 4. GCS verbal subscore is 5. GCS motor subscore is 6.   Skin: Skin is warm, dry and intact. Capillary refill takes less than 2 seconds. No bruising and no rash noted. No erythema.   Psychiatric: He has a normal mood and affect. His behavior is normal. Judgment and thought content normal.          ED Course   Procedures  Labs Reviewed   URINALYSIS, REFLEX TO URINE CULTURE - Abnormal; Notable for the following components:       Result Value    Occult Blood UA 1+ (*)     Leukocytes, UA Trace (*)     All other components within normal limits    Narrative:     Preferred Collection Type->Urine, Clean Catch   URINALYSIS MICROSCOPIC - Abnormal; Notable for the following components:    WBC, UA 6 (*)     Non-Squam Epith 2 (*)     All other components within normal limits    Narrative:     Preferred Collection Type->Urine, Clean Catch   THROAT SCREEN, RAPID   C. TRACHOMATIS/N. GONORRHOEAE BY AMP DNA   CULTURE, STREP A,  THROAT          Imaging Results    None                APC / Resident Notes:   This is an evaluation of a 23 y.o. male that presents to the Emergency Department for sore throat and positive Chlamydia results.  Reports was tested at an outside clinic last week and notified today of his results. Physical Exam shows a non-toxic, afebrile, and well appearing male.  Breath sounds clear and equal to auscultation. Ears without evidence of infection.  Oropharynx with mild erythema without tonsillar exudates.  Midline uvula.  No evidence of PTA.  No cervical lymphadenopathy or meningeal signs. Breath sounds clear to auscultation. Heart regular rhythm.  His abdomen is soft and nontender.  Circumcised penis with scant thin clear discharge. No testicular tenderness. Normal cremaster reflex. Vital signs are reassuring. If available, previous records reviewed. RESULTS:  Rapid strep negative. Urinalysis with trace leukocytes, 1+ occult blood, 6 WBCs with 2 not squamous epithelials.  Patient has an anaphylactic type reaction to penicillin.  Given this I will treat with gentamicin and azithromycin for GC coverage.    My overall impression is exposure to STDs, chlamydia, pharyngitis. I considered, but at this time, do not suspect OM, OE, testicular torsion, epididymitis, pyelonephritis.  Urine culture and throat  culture is pending.    ED Course:  Gentamicin and azithromycin.  Zofran. D/C Information:  STD discharge instructions, follow up with clinic for test of cure. The diagnosis, treatment plan, instructions for follow-up and reevaluation with STD Clinic or PCP as well as ED return precautions were discussed and understanding was verbalized. All questions or concerns have been addressed. This case was discussed with Dr. James who is in agreement with my assessment and plan. LYDIA Espana, GONZALO-C         Scribe Attestation:   Scribe #1: I performed the above scribed service and the documentation accurately describes the services I performed. I attest to the accuracy of the note.    Attending Attestation:           Physician Attestation for Scribe:  Physician Attestation Statement for Scribe #1: I, GONZALO French, reviewed documentation, as scribed by Fidelia Dougherty in my presence, and it is both accurate and complete.                    Clinical Impression:   The primary encounter diagnosis was Chlamydia. Diagnoses of STD exposure, Penile discharge, and Pharyngitis, unspecified etiology were also pertinent to this visit.      Disposition:   Disposition: Discharged  Condition: Stable                        GONZALO Virk  11/19/18 [id number]

## 2018-11-19 NOTE — ED TRIAGE NOTES
Patient came in with c/o sore throat and bleeding of the tongue since October. Patient also states that he recently was tested for STD and it came back positive for chlamydia.

## 2018-11-19 NOTE — DISCHARGE INSTRUCTIONS
It is important that you follow up with the STD Clinic or your primary care doctor for a retest to make sure your chlamydia is cured in 1 week.  Please make sure to use protection in all sexual encounters.    Please return to the Emergency Department for any new or worsening symptoms including: penile pain, penile discharge, difficulty sawllowing, fever, chest pain, shortness of breath, loss of consciousness, dizziness, weakness, or any other concerns.     Please follow up with your Primary Care Provider within in the week. If you do not have one, you may contact the one listed on your discharge paperwork or you may also call the [facility] Appointment Desk at [phone] to schedule an appointment with one.     Warm salt water gargles to help with sore throat.

## 2018-11-20 LAB
C TRACH DNA SPEC QL NAA+PROBE: DETECTED
N GONORRHOEA DNA SPEC QL NAA+PROBE: NOT DETECTED

## 2018-11-21 LAB — BACTERIA THROAT CULT: NORMAL

## 2019-02-03 ENCOUNTER — HOSPITAL ENCOUNTER (EMERGENCY)
Facility: HOSPITAL | Age: 24
Discharge: HOME | End: 2019-02-03
Attending: EMERGENCY MEDICINE

## 2019-02-03 VITALS
WEIGHT: 170 LBS | DIASTOLIC BLOOD PRESSURE: 78 MMHG | HEIGHT: 70 IN | BODY MASS INDEX: 24.34 KG/M2 | HEART RATE: 66 BPM | TEMPERATURE: 98 F | RESPIRATION RATE: 18 BRPM | SYSTOLIC BLOOD PRESSURE: 130 MMHG | OXYGEN SATURATION: 98 %

## 2019-02-03 DIAGNOSIS — S61.210A LACERATION OF RIGHT INDEX FINGER WITHOUT FOREIGN BODY WITHOUT DAMAGE TO NAIL, INITIAL ENCOUNTER: Primary | ICD-10-CM

## 2019-02-03 PROCEDURE — 99283 EMERGENCY DEPT VISIT LOW MDM: CPT | Mod: 25

## 2019-02-03 PROCEDURE — 25000003 PHARM REV CODE 250: Performed by: PHYSICIAN ASSISTANT

## 2019-02-03 PROCEDURE — 12001 RPR S/N/AX/GEN/TRNK 2.5CM/<: CPT

## 2019-02-03 RX ORDER — LIDOCAINE HYDROCHLORIDE 10 MG/ML
10 INJECTION INFILTRATION; PERINEURAL
Status: COMPLETED | OUTPATIENT
Start: 2019-02-03 | End: 2019-02-03

## 2019-02-03 RX ADMIN — BACITRACIN, NEOMYCIN, POLYMYXIN B 1 EACH: 400; 3.5; 5 OINTMENT TOPICAL at 03:02

## 2019-02-03 RX ADMIN — LIDOCAINE HYDROCHLORIDE 10 ML: 10 INJECTION, SOLUTION INFILTRATION; PERINEURAL at 03:02

## 2019-02-03 NOTE — ED TRIAGE NOTES
The pt states he was taking the copper wiring out of a washer and he cut his right index finger on the metal.

## 2019-02-03 NOTE — DISCHARGE INSTRUCTIONS
Please keep your wound clean and dry.  Do not submerge wound under water. Wash gently with soap and water and apply antibiotic ointment (bacitracin, neosporin, etc.) over the wound after washing.     Please watch for signs of infection including: increased\spreading redness, swelling, pus-like discharge, or a fever greater than 100.4F. If you experience any of these, please contact your Primary Care Doctor or Return to the Emergency Department for a wound check.     Please follow up with your Primary Care Doctor in 7-10 days for wound recheck and suture removal.  You may return to the Emergency Department if you are unable to see your Primary Care Doctor.  Please return to the ER for any new or worsening symptoms.

## 2019-02-03 NOTE — ED PROVIDER NOTES
Encounter Date: 2/3/2019    SCRIBE #1 NOTE: ICecilia am scribing for, and in the presence of,  Janet Ayers PA-C. I have scribed the following portions of the note - Other sections scribed: HPI and ROS.       History     Chief Complaint   Patient presents with    Laceration     pt here with multiple cuts to hands. cut on metal scraps. bleeding controlled. our system shows last tdap 03/2017.     CC: Laceration    HPI: This 23 y.o male who has asthma presents to the ED for an evaluation of acute onset, constant painful laceration to the right index finger that occurred earlier today.  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  Patient denies fever, chills, nausea, emesis, diarrhea, abdominal pain, extremity numbness, extremity weakness, or any other associated symptoms.  No prior tx.  No alleviating factors.  Tetanus is up to date.        The history is provided by the patient. No  was used.     Review of patient's allergies indicates:   Allergen Reactions    Penicillins Anaphylaxis    Eggs [egg derived]      Past Medical History:   Diagnosis Date    Asthma     Deviated nasal septum      Past Surgical History:   Procedure Laterality Date    CYST REMOVAL      KNEE ARTHROSCOPY      NOSE SURGERY       History reviewed. No pertinent family history.  Social History     Tobacco Use    Smoking status: Current Every Day Smoker     Packs/day: 0.50     Types: Cigarettes    Smokeless tobacco: Never Used   Substance Use Topics    Alcohol use: No    Drug use: No     Review of Systems   Constitutional: Negative for chills and fever.   HENT: Negative for congestion, ear pain and sore throat.    Eyes: Negative for pain.   Respiratory: Negative for cough and shortness of breath.    Cardiovascular: Negative for chest pain.   Gastrointestinal: Negative for abdominal pain, constipation,  diarrhea, nausea and vomiting.   Genitourinary: Negative for decreased urine volume and dysuria.   Musculoskeletal: Negative for back pain and neck pain.   Skin: Positive for wound. Negative for rash.   Neurological: Negative for weakness, numbness and headaches.   Psychiatric/Behavioral: Negative for confusion.       Physical Exam     Initial Vitals [02/03/19 1455]   BP Pulse Resp Temp SpO2   127/66 78 18 98.9 °F (37.2 °C) 97 %      MAP       --         Physical Exam    Nursing note and vitals reviewed.  Constitutional: Vital signs are normal. He appears well-developed and well-nourished. He is not diaphoretic. He is cooperative.  Non-toxic appearance. He does not have a sickly appearance. He does not appear ill. No distress.   HENT:   Head: Normocephalic and atraumatic.   Right Ear: Tympanic membrane, external ear and ear canal normal.   Left Ear: Tympanic membrane, external ear and ear canal normal.   Nose: Nose normal.   Mouth/Throat: Uvula is midline, oropharynx is clear and moist and mucous membranes are normal.   Eyes: Conjunctivae, EOM and lids are normal. Pupils are equal, round, and reactive to light.   Neck: Trachea normal, normal range of motion, full passive range of motion without pain and phonation normal. Neck supple.   Cardiovascular: Normal rate, regular rhythm, normal heart sounds and intact distal pulses. Exam reveals no gallop and no friction rub.    No murmur heard.  Pulses:       Radial pulses are 2+ on the right side, and 2+ on the left side.   Capillary refill less than 2 sec in bilateral upper extremities.   Pulmonary/Chest: Effort normal and breath sounds normal. No respiratory distress. He has no decreased breath sounds. He has no wheezes. He has no rhonchi. He has no rales.   Abdominal: Soft. Normal appearance and bowel sounds are normal. He exhibits no distension. There is no tenderness. There is no rigidity, no rebound, no guarding and no CVA tenderness.   Musculoskeletal: Normal range  of motion.        Right hand: He exhibits laceration. He exhibits normal range of motion, no tenderness, no bony tenderness, normal capillary refill, no deformity and no swelling. Normal strength noted.   Continue medial laceration to the medial aspect of the right index finger.  Full range of motion of the bilateral upper extremities.  strength intact.  Sensation and strength intact.  Peripheral pulses intact. Capillary refill less than 2 sec in bilateral upper extremities. No evidence of infection or sign of flexor tenosynovitis. No obvious bony deformity.   Neurological: He is alert and oriented to person, place, and time. He has normal strength. No cranial nerve deficit or sensory deficit. He exhibits normal muscle tone. Coordination and gait normal. GCS eye subscore is 4. GCS verbal subscore is 5. GCS motor subscore is 6.   Skin: Skin is warm and dry. Capillary refill takes less than 2 seconds. Laceration (right index finger) noted. No rash noted.   No foreign body.  No sinus cellulitis or abscess.  No skin breakdown.   Psychiatric: He has a normal mood and affect. His speech is normal and behavior is normal. Judgment and thought content normal. Cognition and memory are normal.         ED Course   Lac Repair  Date/Time: 2/3/2019 6:50 PM  Performed by: Janet Cowan PA-C  Authorized by: Kathy Holley MD   Consent Done: Yes  Consent: Verbal consent obtained.  Risks and benefits: risks, benefits and alternatives were discussed  Consent given by: patient  Patient understanding: patient states understanding of the procedure being performed  Patient consent: the patient's understanding of the procedure matches consent given  Patient identity confirmed: verbally with patient  Body area: upper extremity  Location details: right index finger  Laceration length: 1 cm  Foreign bodies: no foreign bodies  Tendon involvement: none  Nerve involvement: none  Vascular damage: no  Anesthesia: local  "infiltration    Anesthesia:  Local Anesthetic: lidocaine 1% without epinephrine  Anesthetic total: 1 mL  Preparation: Patient was prepped and draped in the usual sterile fashion.  Irrigation solution: saline  Irrigation method: jet lavage  Amount of cleaning: standard  Debridement: none  Degree of undermining: none  Skin closure: 4-0 Prolene  Number of sutures: 1  Technique: simple  Approximation: close  Approximation difficulty: simple  Dressing: antibiotic ointment and pressure/compression dressing  Patient tolerance: Patient tolerated the procedure well with no immediate complications        Labs Reviewed - No data to display       Imaging Results          X-Ray Hand 3 view Right (Final result)  Result time 02/03/19 15:36:28    Final result by Karol Christian MD (02/03/19 15:36:28)                 Impression:      As above.      Electronically signed by: Karol Christian MD  Date:    02/03/2019  Time:    15:36             Narrative:    EXAMINATION:  XR HAND COMPLETE 3 VIEW RIGHT    CLINICAL HISTORY:  laceration;    TECHNIQUE:  PA, lateral, and oblique views of the right hand were performed.    COMPARISON:  01/20/2018    FINDINGS:  Small soft tissue laceration of the lateral aspect of the 2nd right digit without underlying fracture, dislocation or radiopaque foreign body. .                                       APC / Resident Notes:   This is an emergent evaluation of a 23 y.o. male with PMHx of asthma presenting to the ED for a laceration to the right index finger.  Patient reports accidentally cutting his right index finger on a piece of metal while scrapping." Denies fever and numbness. Afebrile. Patient is non-toxic appearing and in no acute distress. 1 cm laceration of the right index finger without foreign body visualized. No evidence of foreign body, acute fracture/dislocation, or osteomyelitis on xray. No bony tenderness. No neurovascular compromise or injury. No evidence of tendon disruption or " ligamentous injury. No evidence for infection at this time.     Laceration will require closure to achieve and maintain hemostasis and to promote optimal wound healing after the wound is copiously irrigated at high pressure.Tetanus status is up to date. Dressed with antibiotic ointment and non-adherent dressing. Discharged with wound care instructions and instructed to follow up with PCP for reevaluation and suture removal in 7-10 days.     I discussed with the patient the diagnosis, treatment plan, indications for return to the emergency department, and for expected follow-up. The patient verbalized an understanding. The patient is asked if there are any questions or concerns. We discuss the case, until all issues are addressed to the patients satisfaction. Patient understands and is agreeable to the plan.     Janet Cowan PA-C             Scribe Attestation:   Scribe #1: I performed the above scribed service and the documentation accurately describes the services I performed. I attest to the accuracy of the note.    Attending Attestation:           Physician Attestation for Scribe:  Physician Attestation Statement for Scribe #1: I, Janet Cowan PA-C, reviewed documentation, as scribed by Cecilia Westbrook in my presence, and it is both accurate and complete.                    Clinical Impression:   The encounter diagnosis was Laceration of right index finger without foreign body without damage to nail, initial encounter.      Disposition:   Disposition: Discharged  Condition: Stable                        Janet Cowan PA-C  02/03/19 2509

## 2019-07-19 ENCOUNTER — HOSPITAL ENCOUNTER (EMERGENCY)
Facility: HOSPITAL | Age: 24
Discharge: HOME | End: 2019-07-19
Attending: EMERGENCY MEDICINE
Payer: MEDICAID

## 2019-07-19 VITALS
HEART RATE: 71 BPM | HEIGHT: 69 IN | SYSTOLIC BLOOD PRESSURE: 115 MMHG | TEMPERATURE: 100 F | BODY MASS INDEX: 22.22 KG/M2 | DIASTOLIC BLOOD PRESSURE: 65 MMHG | WEIGHT: 150 LBS | RESPIRATION RATE: 16 BRPM | OXYGEN SATURATION: 98 %

## 2019-07-19 DIAGNOSIS — T14.90XA TRAUMA: ICD-10-CM
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
## 2019-07-19 PROCEDURE — 99284 EMERGENCY DEPT VISIT MOD MDM: CPT | Mod: 25

## 2019-07-19 PROCEDURE — 25000003 PHARM REV CODE 250: Performed by: EMERGENCY MEDICINE

## 2019-07-19 RX ORDER — OXYCODONE AND ACETAMINOPHEN 5; 325 MG/1; MG/1
1 TABLET ORAL
Status: COMPLETED | OUTPATIENT
Start: 2019-07-19 | End: 2019-07-19

## 2019-07-19 RX ORDER — OXYCODONE HCL 10 MG/1
10 TABLET, FILM COATED, EXTENDED RELEASE ORAL EVERY 12 HOURS PRN
COMMUNITY

## 2019-07-19 RX ORDER — OXYCODONE AND ACETAMINOPHEN 5; 325 MG/1; MG/1
1 TABLET ORAL EVERY 6 HOURS PRN
Qty: 15 TABLET | Refills: 0 | Status: SHIPPED | OUTPATIENT
Start: 2019-07-19

## 2019-07-19 RX ADMIN — OXYCODONE HYDROCHLORIDE AND ACETAMINOPHEN 1 TABLET: 5; 325 TABLET ORAL at 08:07
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
## 2019-07-19 NOTE — DISCHARGE INSTRUCTIONS
Thank you for coming to our Emergency Department today. It is important to remember that some problems are difficult to diagnose and may not be found during your first visit. Be sure to follow up with your primary care doctor and review any labs/imaging that was performed with them. If you do not have a primary care doctor, you may contact the one listed on your discharge paperwork or you may also call the [facility] Appointment Desk at [phone] to schedule an appointment with one.     All medications may potentially have side effects and it is impossible to predict which medications may give you side effects. If you feel that you are having a negative effect of any medication you should immediately stop taking them and seek medical attention.    Return to the ER with any questions/concerns, new/concerning symptoms, worsening or failure to improve. Do not drive or make any important decisions for 24 hours if you have received any pain medications, sedatives or mood altering drugs during your ER visit.
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
## 2019-07-20 NOTE — ED NOTES
Clem Ledbetter, patient in [facility], agreed to take patient home. Spoke with mother. Confirms ashvin

## 2019-12-15 ENCOUNTER — HOSPITAL ENCOUNTER (EMERGENCY)
Facility: HOSPITAL | Age: 24
Discharge: HOME | End: 2019-12-15
Attending: EMERGENCY MEDICINE
Payer: MEDICAID

## 2019-12-15 VITALS
HEIGHT: 70 IN | WEIGHT: 160 LBS | HEART RATE: 52 BPM | BODY MASS INDEX: 22.9 KG/M2 | TEMPERATURE: 98 F | DIASTOLIC BLOOD PRESSURE: 65 MMHG | RESPIRATION RATE: 18 BRPM | SYSTOLIC BLOOD PRESSURE: 113 MMHG | OXYGEN SATURATION: 97 %

## 2019-12-15 DIAGNOSIS — M25.569 KNEE PAIN: Primary | ICD-10-CM
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
## 2019-12-15 PROCEDURE — 99284 EMERGENCY DEPT VISIT MOD MDM: CPT | Mod: 25

## 2019-12-15 RX ORDER — CIPROFLOXACIN 500 MG/1
500 TABLET ORAL 2 TIMES DAILY
Qty: 14 TABLET | Refills: 0 | Status: SHIPPED | OUTPATIENT
Start: 2019-12-15 | End: 2019-12-22

## 2019-12-15 RX ORDER — CLINDAMYCIN HYDROCHLORIDE 150 MG/1
450 CAPSULE ORAL EVERY 8 HOURS
Qty: 63 CAPSULE | Refills: 0 | Status: SHIPPED | OUTPATIENT
Start: 2019-12-15 | End: 2019-12-22

## 2019-12-15 RX ORDER — IBUPROFEN 600 MG/1
600 TABLET ORAL EVERY 6 HOURS PRN
Qty: 20 TABLET | Refills: 0 | Status: SHIPPED | OUTPATIENT
Start: 2019-12-15 | End: 2019-12-20
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
